# Patient Record
Sex: FEMALE | Race: WHITE
[De-identification: names, ages, dates, MRNs, and addresses within clinical notes are randomized per-mention and may not be internally consistent; named-entity substitution may affect disease eponyms.]

---

## 2018-05-09 ENCOUNTER — HOSPITAL ENCOUNTER (OUTPATIENT)
Dept: HOSPITAL 91 - OBT | Age: 28
Discharge: HOME | End: 2018-05-09
Payer: MEDICAID

## 2018-05-09 ENCOUNTER — HOSPITAL ENCOUNTER (OUTPATIENT)
Age: 28
Discharge: HOME | End: 2018-05-09

## 2018-05-09 DIAGNOSIS — Z3A.40: ICD-10-CM

## 2018-05-09 PROCEDURE — 76818 FETAL BIOPHYS PROFILE W/NST: CPT

## 2018-05-12 ENCOUNTER — HOSPITAL ENCOUNTER (INPATIENT)
Dept: HOSPITAL 91 - OBT | Age: 28
LOS: 3 days | Discharge: HOME | End: 2018-05-15
Payer: MEDICAID

## 2018-05-12 ENCOUNTER — HOSPITAL ENCOUNTER (INPATIENT)
Age: 28
LOS: 3 days | Discharge: HOME | End: 2018-05-15

## 2018-05-12 DIAGNOSIS — Z3A.40: ICD-10-CM

## 2018-05-12 DIAGNOSIS — Z23: ICD-10-CM

## 2018-05-12 DIAGNOSIS — O48.0: Primary | ICD-10-CM

## 2018-05-12 LAB
ADD MAN DIFF?: NO
BASOPHIL #: 0 10^3/UL (ref 0–0.1)
BASOPHILS %: 0.5 % (ref 0–2)
EOSINOPHILS #: 0.2 10^3/UL (ref 0–0.5)
EOSINOPHILS %: 3.1 % (ref 0–7)
HEMATOCRIT: 29.3 % (ref 37–47)
HEMOGLOBIN: 9.5 G/DL (ref 12–16)
INR: 0.99
LYMPHOCYTES #: 2.1 10^3/UL (ref 0.8–2.9)
LYMPHOCYTES %: 27.5 % (ref 15–51)
MEAN CORPUSCULAR HEMOGLOBIN: 28.6 PG (ref 29–33)
MEAN CORPUSCULAR HGB CONC: 32.4 G/DL (ref 32–37)
MEAN CORPUSCULAR VOLUME: 88.3 FL (ref 82–101)
MEAN PLATELET VOLUME: 11.5 FL (ref 7.4–10.4)
MONOCYTE #: 0.7 10^3/UL (ref 0.3–0.9)
MONOCYTES %: 9 % (ref 0–11)
NEUTROPHIL #: 4.5 10^3/UL (ref 1.6–7.5)
NEUTROPHILS %: 59 % (ref 39–77)
NUCLEATED RED BLOOD CELLS #: 0 10^3/UL (ref 0–0)
NUCLEATED RED BLOOD CELLS%: 0 /100WBC (ref 0–0)
PARTIAL THROMBOPLASTIN TIME: 26.3 SEC (ref 25–35)
PLATELET COUNT: 210 10^3/UL (ref 140–415)
PROTIME: 13.2 SEC (ref 11.9–14.9)
PT RATIO: 1
RAPID PLASMA REAGIN: NONREACTIVE
RED BLOOD COUNT: 3.32 10^6/UL (ref 4.2–5.4)
RED CELL DISTRIBUTION WIDTH: 13.6 % (ref 11.5–14.5)
WHITE BLOOD COUNT: 7.7 10^3/UL (ref 4.8–10.8)

## 2018-05-12 PROCEDURE — 86900 BLOOD TYPING SEROLOGIC ABO: CPT

## 2018-05-12 PROCEDURE — 85025 COMPLETE CBC W/AUTO DIFF WBC: CPT

## 2018-05-12 PROCEDURE — 85730 THROMBOPLASTIN TIME PARTIAL: CPT

## 2018-05-12 PROCEDURE — 86592 SYPHILIS TEST NON-TREP QUAL: CPT

## 2018-05-12 PROCEDURE — 86901 BLOOD TYPING SEROLOGIC RH(D): CPT

## 2018-05-12 PROCEDURE — 88307 TISSUE EXAM BY PATHOLOGIST: CPT

## 2018-05-12 PROCEDURE — 76815 OB US LIMITED FETUS(S): CPT

## 2018-05-12 PROCEDURE — 85610 PROTHROMBIN TIME: CPT

## 2018-05-12 PROCEDURE — 4A1HXCZ MONITORING OF PRODUCTS OF CONCEPTION, CARDIAC RATE, EXTERNAL APPROACH: ICD-10-PCS

## 2018-05-12 PROCEDURE — 86850 RBC ANTIBODY SCREEN: CPT

## 2018-05-12 PROCEDURE — 62319: CPT

## 2018-05-12 PROCEDURE — 36415 COLL VENOUS BLD VENIPUNCTURE: CPT

## 2018-05-12 PROCEDURE — 90715 TDAP VACCINE 7 YRS/> IM: CPT

## 2018-05-12 RX ADMIN — AMPICILLIN 1 MLS/HR: 1 INJECTION, POWDER, FOR SOLUTION INTRAMUSCULAR; INTRAVENOUS at 08:43

## 2018-05-12 RX ADMIN — PYRIDOXINE HYDROCHLORIDE 1 MLS/HR: 100 INJECTION, SOLUTION INTRAMUSCULAR; INTRAVENOUS at 10:43

## 2018-05-12 RX ADMIN — AMPICILLIN 1 MLS/HR: 2 INJECTION, POWDER, FOR SOLUTION INTRAVENOUS at 05:38

## 2018-05-12 RX ADMIN — ACETAMINOPHEN 1 MG: 325 TABLET, FILM COATED ORAL at 19:25

## 2018-05-12 RX ADMIN — BUTORPHANOL TARTRATE 1 MG: 2 INJECTION, SOLUTION INTRAMUSCULAR; INTRAVENOUS at 07:35

## 2018-05-12 RX ADMIN — AMPICILLIN 1 MLS/HR: 1 INJECTION, POWDER, FOR SOLUTION INTRAMUSCULAR; INTRAVENOUS at 19:47

## 2018-05-12 RX ADMIN — PYRIDOXINE HYDROCHLORIDE 1 MLS/HR: 100 INJECTION, SOLUTION INTRAMUSCULAR; INTRAVENOUS at 07:39

## 2018-05-12 RX ADMIN — Medication 1 MLS/HR: at 11:31

## 2018-05-12 RX ADMIN — FENTANYL CIT 0.2 MG/100ML-ROPIV 0.2%-NACL 0.9% EPIDURAL INJ 1 ML: 2/0.2 SOLUTION at 19:58

## 2018-05-12 RX ADMIN — PYRIDOXINE HYDROCHLORIDE 1 MLS/HR: 100 INJECTION, SOLUTION INTRAMUSCULAR; INTRAVENOUS at 21:42

## 2018-05-12 RX ADMIN — AMPICILLIN 1 MLS/HR: 1 INJECTION, POWDER, FOR SOLUTION INTRAMUSCULAR; INTRAVENOUS at 16:33

## 2018-05-12 RX ADMIN — PYRIDOXINE HYDROCHLORIDE 1 MLS/HR: 100 INJECTION, SOLUTION INTRAMUSCULAR; INTRAVENOUS at 19:19

## 2018-05-12 RX ADMIN — AMPICILLIN 1 MLS/HR: 1 INJECTION, POWDER, FOR SOLUTION INTRAMUSCULAR; INTRAVENOUS at 12:47

## 2018-05-12 RX ADMIN — PYRIDOXINE HYDROCHLORIDE 1 MLS/HR: 100 INJECTION, SOLUTION INTRAMUSCULAR; INTRAVENOUS at 16:56

## 2018-05-12 RX ADMIN — Medication 1 MCG: at 06:41

## 2018-05-12 RX ADMIN — PYRIDOXINE HYDROCHLORIDE 1 MLS/HR: 100 INJECTION, SOLUTION INTRAMUSCULAR; INTRAVENOUS at 04:50

## 2018-05-12 RX ADMIN — Medication 1 MLS/HR: at 23:54

## 2018-05-13 RX ADMIN — IBUPROFEN 1 MG: 600 TABLET ORAL at 18:02

## 2018-05-13 RX ADMIN — IBUPROFEN 1 MG: 600 TABLET ORAL at 23:45

## 2018-05-13 RX ADMIN — Medication 1 SPRAY: at 06:19

## 2018-05-13 RX ADMIN — IBUPROFEN 1 MG: 600 TABLET ORAL at 00:30

## 2018-05-13 RX ADMIN — WITCH HAZEL 1 PAD: 500 SOLUTION RECTAL; TOPICAL at 06:19

## 2018-05-13 RX ADMIN — IBUPROFEN 1 MG: 600 TABLET ORAL at 06:19

## 2018-05-13 RX ADMIN — IBUPROFEN 1 MG: 600 TABLET ORAL at 12:46

## 2018-05-13 RX ADMIN — Medication 1 APPLIC: at 06:19

## 2018-05-14 LAB
ADD MAN DIFF?: NO
BASOPHIL #: 0.1 10^3/UL (ref 0–0.1)
BASOPHILS %: 0.5 % (ref 0–2)
EOSINOPHILS #: 0.4 10^3/UL (ref 0–0.5)
EOSINOPHILS %: 3 % (ref 0–7)
HEMATOCRIT: 25.8 % (ref 37–47)
HEMOGLOBIN: 8.5 G/DL (ref 12–16)
LYMPHOCYTES #: 3.5 10^3/UL (ref 0.8–2.9)
LYMPHOCYTES %: 23.4 % (ref 15–51)
MEAN CORPUSCULAR HEMOGLOBIN: 29.2 PG (ref 29–33)
MEAN CORPUSCULAR HGB CONC: 32.9 G/DL (ref 32–37)
MEAN CORPUSCULAR VOLUME: 88.7 FL (ref 82–101)
MEAN PLATELET VOLUME: 11.3 FL (ref 7.4–10.4)
MONOCYTE #: 1 10^3/UL (ref 0.3–0.9)
MONOCYTES %: 6.6 % (ref 0–11)
NEUTROPHIL #: 9.8 10^3/UL (ref 1.6–7.5)
NEUTROPHILS %: 65.7 % (ref 39–77)
NUCLEATED RED BLOOD CELLS #: 0 10^3/UL (ref 0–0)
NUCLEATED RED BLOOD CELLS%: 0 /100WBC (ref 0–0)
PLATELET COUNT: 179 10^3/UL (ref 140–415)
RED BLOOD COUNT: 2.91 10^6/UL (ref 4.2–5.4)
RED CELL DISTRIBUTION WIDTH: 14.2 % (ref 11.5–14.5)
WHITE BLOOD COUNT: 14.9 10^3/UL (ref 4.8–10.8)

## 2018-05-14 RX ADMIN — IBUPROFEN 1 MG: 600 TABLET ORAL at 17:53

## 2018-05-14 RX ADMIN — IBUPROFEN 1 MG: 600 TABLET ORAL at 12:00

## 2018-05-14 RX ADMIN — IBUPROFEN 1 MG: 600 TABLET ORAL at 06:00

## 2018-05-15 PROCEDURE — 3E0234Z INTRODUCTION OF SERUM, TOXOID AND VACCINE INTO MUSCLE, PERCUTANEOUS APPROACH: ICD-10-PCS

## 2018-05-15 RX ADMIN — CLOSTRIDIUM TETANI TOXOID ANTIGEN (FORMALDEHYDE INACTIVATED), CORYNEBACTERIUM DIPHTHERIAE TOXOID ANTIGEN (FORMALDEHYDE INACTIVATED), BORDETELLA PERTUSSIS TOXOID ANTIGEN (GLUTARALDEHYDE INACTIVATED), BORDETELLA PERTUSSIS FILAMENTOUS HEMAGGLUTININ ANTIGEN (FORMALDEHYDE INACTIVATED), BORDETELLA PERTUSSIS PERTACTIN ANTIGEN, AND BORDETELLA PERTUSSIS FIMBRIAE 2/3 ANTIGEN 1 ML: 5; 2; 2.5; 5; 3; 5 INJECTION, SUSPENSION INTRAMUSCULAR at 10:34

## 2018-05-15 RX ADMIN — DOCUSATE SODIUM AND SENNOSIDES 1 TAB: 8.6; 5 TABLET, FILM COATED ORAL at 10:33

## 2018-05-15 RX ADMIN — IBUPROFEN 1 MG: 600 TABLET ORAL at 06:21

## 2018-05-15 RX ADMIN — MEASLES, MUMPS, AND RUBELLA VIRUS VACCINE LIVE 1 ML: 1000; 12500; 1000 INJECTION, POWDER, LYOPHILIZED, FOR SUSPENSION SUBCUTANEOUS at 07:40

## 2018-05-15 RX ADMIN — IBUPROFEN 1 MG: 600 TABLET ORAL at 00:41

## 2018-12-21 ENCOUNTER — HOSPITAL ENCOUNTER (EMERGENCY)
Age: 28
Discharge: HOME | End: 2018-12-21

## 2018-12-21 ENCOUNTER — HOSPITAL ENCOUNTER (EMERGENCY)
Dept: HOSPITAL 91 - FTE | Age: 28
Discharge: HOME | End: 2018-12-21
Payer: SELF-PAY

## 2018-12-21 DIAGNOSIS — R10.2: ICD-10-CM

## 2018-12-21 DIAGNOSIS — N39.0: Primary | ICD-10-CM

## 2018-12-21 DIAGNOSIS — Z33.1: ICD-10-CM

## 2018-12-21 LAB
ADD MAN DIFF?: NO
ADD UMIC: YES
BASOPHIL #: 0 10^3/UL (ref 0–0.1)
BASOPHILS %: 0.3 % (ref 0–2)
EOSINOPHILS #: 0 10^3/UL (ref 0–0.5)
EOSINOPHILS %: 0.2 % (ref 0–7)
HEMATOCRIT: 34.9 % (ref 37–47)
HEMOGLOBIN: 11.7 G/DL (ref 12–16)
IMMATURE GRANS #M: 0.09 10^3/UL (ref 0–0.03)
IMMATURE GRANS % (M): 0.6 % (ref 0–0.43)
LYMPHOCYTES #: 0.8 10^3/UL (ref 0.8–2.9)
LYMPHOCYTES %: 5.7 % (ref 15–51)
MEAN CORPUSCULAR HEMOGLOBIN: 29.2 PG (ref 29–33)
MEAN CORPUSCULAR HGB CONC: 33.5 G/DL (ref 32–37)
MEAN CORPUSCULAR VOLUME: 87 FL (ref 82–101)
MEAN PLATELET VOLUME: 11.1 FL (ref 7.4–10.4)
MONOCYTE #: 1.2 10^3/UL (ref 0.3–0.9)
MONOCYTES %: 8.4 % (ref 0–11)
NEUTROPHIL #: 11.9 10^3/UL (ref 1.6–7.5)
NEUTROPHILS %: 84.8 % (ref 39–77)
NUCLEATED RED BLOOD CELLS #: 0 10^3/UL (ref 0–0)
NUCLEATED RED BLOOD CELLS%: 0 /100WBC (ref 0–0)
PLATELET COUNT: 237 10^3/UL (ref 140–415)
RED BLOOD COUNT: 4.01 10^6/UL (ref 4.2–5.4)
RED CELL DISTRIBUTION WIDTH: 13.8 % (ref 11.5–14.5)
UR ASCORBIC ACID: NEGATIVE MG/DL
UR BACTERIA: (no result) /HPF
UR BILIRUBIN (DIP): NEGATIVE MG/DL
UR BLOOD (DIP): (no result) MG/DL
UR CLARITY: CLEAR
UR COLOR: YELLOW
UR GLUCOSE (DIP): NEGATIVE MG/DL
UR KETONES (DIP): (no result) MG/DL
UR LEUKOCYTE ESTERASE (DIP): (no result) LEU/UL
UR NITRITE (DIP): NEGATIVE MG/DL
UR PH (DIP): 7 (ref 5–9)
UR RBC: 1 /HPF (ref 0–5)
UR SPECIFIC GRAVITY (DIP): 1.01 (ref 1–1.03)
UR SQUAMOUS EPITHELIAL CELL: (no result) /HPF
UR TOTAL PROTEIN (DIP): NEGATIVE MG/DL
UR UROBILINOGEN (DIP): (no result) MG/DL
UR WBC: 46 /HPF (ref 0–5)
URINE BLOOD (DIP) POC: (no result)
URINE LEUKOCYTE EST (DIP) POC: (no result)
URINE PH (DIP) POC: 7 (ref 5–8.5)
URINE TOTAL PROTEIN POC: (no result)
WHITE BLOOD COUNT: 14.1 10^3/UL (ref 4.8–10.8)

## 2018-12-21 PROCEDURE — 84702 CHORIONIC GONADOTROPIN TEST: CPT

## 2018-12-21 PROCEDURE — 99285 EMERGENCY DEPT VISIT HI MDM: CPT

## 2018-12-21 PROCEDURE — 76801 OB US < 14 WKS SINGLE FETUS: CPT

## 2018-12-21 PROCEDURE — 81025 URINE PREGNANCY TEST: CPT

## 2018-12-21 PROCEDURE — 36415 COLL VENOUS BLD VENIPUNCTURE: CPT

## 2018-12-21 PROCEDURE — 81003 URINALYSIS AUTO W/O SCOPE: CPT

## 2018-12-21 PROCEDURE — 81001 URINALYSIS AUTO W/SCOPE: CPT

## 2018-12-21 PROCEDURE — 86900 BLOOD TYPING SEROLOGIC ABO: CPT

## 2018-12-21 PROCEDURE — 87400 INFLUENZA A/B EACH AG IA: CPT

## 2018-12-21 PROCEDURE — 96372 THER/PROPH/DIAG INJ SC/IM: CPT

## 2018-12-21 PROCEDURE — 85025 COMPLETE CBC W/AUTO DIFF WBC: CPT

## 2018-12-21 PROCEDURE — 86901 BLOOD TYPING SEROLOGIC RH(D): CPT

## 2018-12-21 PROCEDURE — 87086 URINE CULTURE/COLONY COUNT: CPT

## 2018-12-21 RX ADMIN — CEFTRIAXONE SODIUM 1 GM: 1 INJECTION, POWDER, FOR SOLUTION INTRAMUSCULAR; INTRAVENOUS at 11:24

## 2018-12-21 RX ADMIN — LIDOCAINE HYDROCHLORIDE 1 ML: 10 INJECTION, SOLUTION EPIDURAL; INFILTRATION; INTRACAUDAL; PERINEURAL at 11:24

## 2018-12-21 RX ADMIN — ONDANSETRON 1 MG: 4 TABLET, ORALLY DISINTEGRATING ORAL at 09:27

## 2018-12-21 RX ADMIN — ACETAMINOPHEN 1 MG: 500 TABLET, FILM COATED ORAL at 09:26

## 2019-06-23 ENCOUNTER — HOSPITAL ENCOUNTER (INPATIENT)
Dept: HOSPITAL 91 - OBT | Age: 29
LOS: 2 days | Discharge: HOME | DRG: 831 | End: 2019-06-25
Payer: MEDICAID

## 2019-06-23 ENCOUNTER — HOSPITAL ENCOUNTER (INPATIENT)
Dept: HOSPITAL 10 - L-D | Age: 29
LOS: 2 days | Discharge: HOME | DRG: 831 | End: 2019-06-25
Attending: OBSTETRICS & GYNECOLOGY | Admitting: OBSTETRICS & GYNECOLOGY
Payer: MEDICAID

## 2019-06-23 VITALS
HEIGHT: 63 IN | HEIGHT: 63 IN | WEIGHT: 174.17 LBS | BODY MASS INDEX: 30.86 KG/M2 | BODY MASS INDEX: 30.86 KG/M2 | WEIGHT: 174.17 LBS

## 2019-06-23 VITALS — HEART RATE: 80 BPM | RESPIRATION RATE: 18 BRPM | SYSTOLIC BLOOD PRESSURE: 117 MMHG | DIASTOLIC BLOOD PRESSURE: 63 MMHG

## 2019-06-23 DIAGNOSIS — O23.03: Primary | ICD-10-CM

## 2019-06-23 DIAGNOSIS — N13.6: ICD-10-CM

## 2019-06-23 DIAGNOSIS — O60.03: ICD-10-CM

## 2019-06-23 DIAGNOSIS — O99.89: ICD-10-CM

## 2019-06-23 DIAGNOSIS — Z3A.36: ICD-10-CM

## 2019-06-23 LAB
ADD MAN DIFF?: NO
ADD UMIC: YES
BASOPHIL #: 0 10^3/UL (ref 0–0.1)
BASOPHILS %: 0.4 % (ref 0–2)
EOSINOPHILS #: 0.2 10^3/UL (ref 0–0.5)
EOSINOPHILS %: 2.4 % (ref 0–7)
HEMATOCRIT: 27.6 % (ref 37–47)
HEMOGLOBIN: 8.8 G/DL (ref 12–16)
IMMATURE GRANS #M: 0.07 10^3/UL (ref 0–0.03)
IMMATURE GRANS % (M): 0.7 % (ref 0–0.43)
LYMPHOCYTES #: 1.6 10^3/UL (ref 0.8–2.9)
LYMPHOCYTES %: 17.1 % (ref 15–51)
MEAN CORPUSCULAR HEMOGLOBIN: 27.8 PG (ref 29–33)
MEAN CORPUSCULAR HGB CONC: 31.9 G/DL (ref 32–37)
MEAN CORPUSCULAR VOLUME: 87.1 FL (ref 82–101)
MEAN PLATELET VOLUME: 10.4 FL (ref 7.4–10.4)
MONOCYTE #: 0.7 10^3/UL (ref 0.3–0.9)
MONOCYTES %: 7.4 % (ref 0–11)
NEUTROPHIL #: 6.7 10^3/UL (ref 1.6–7.5)
NEUTROPHILS %: 72 % (ref 39–77)
NUCLEATED RED BLOOD CELLS #: 0 10^3/UL (ref 0–0)
NUCLEATED RED BLOOD CELLS%: 0 /100WBC (ref 0–0)
PLATELET COUNT: 189 10^3/UL (ref 140–415)
RED BLOOD COUNT: 3.17 10^6/UL (ref 4.2–5.4)
RED CELL DISTRIBUTION WIDTH: 14.6 % (ref 11.5–14.5)
UR ASCORBIC ACID: NEGATIVE MG/DL
UR BACTERIA: (no result) /HPF
UR BILIRUBIN (DIP): NEGATIVE MG/DL
UR BLOOD (DIP): (no result) MG/DL
UR CLARITY: (no result)
UR COLOR: YELLOW
UR GLUCOSE (DIP): NEGATIVE MG/DL
UR KETONES (DIP): NEGATIVE MG/DL
UR LEUKOCYTE ESTERASE (DIP): (no result) LEU/UL
UR NITRITE (DIP): NEGATIVE MG/DL
UR PH (DIP): 7 (ref 5–9)
UR RBC: 4 /HPF (ref 0–5)
UR SPECIFIC GRAVITY (DIP): 1.01 (ref 1–1.03)
UR SQUAMOUS EPITHELIAL CELL: (no result) /HPF
UR TOTAL PROTEIN (DIP): NEGATIVE MG/DL
UR UROBILINOGEN (DIP): NEGATIVE MG/DL
UR WBC: 8 /HPF (ref 0–5)
WHITE BLOOD COUNT: 9.3 10^3/UL (ref 4.8–10.8)

## 2019-06-23 PROCEDURE — G0463 HOSPITAL OUTPT CLINIC VISIT: HCPCS

## 2019-06-23 PROCEDURE — 76818 FETAL BIOPHYS PROFILE W/NST: CPT

## 2019-06-23 PROCEDURE — 81001 URINALYSIS AUTO W/SCOPE: CPT

## 2019-06-23 PROCEDURE — 76775 US EXAM ABDO BACK WALL LIM: CPT

## 2019-06-23 PROCEDURE — 85025 COMPLETE CBC W/AUTO DIFF WBC: CPT

## 2019-06-23 PROCEDURE — 83540 ASSAY OF IRON: CPT

## 2019-06-23 PROCEDURE — 87086 URINE CULTURE/COLONY COUNT: CPT

## 2019-06-23 PROCEDURE — 82728 ASSAY OF FERRITIN: CPT

## 2019-06-23 RX ADMIN — PYRIDOXINE HYDROCHLORIDE 1 MLS/HR: 100 INJECTION, SOLUTION INTRAMUSCULAR; INTRAVENOUS at 19:30

## 2019-06-23 RX ADMIN — CEFAZOLIN SODIUM 1 MLS/HR: 2 SOLUTION INTRAVENOUS at 13:23

## 2019-06-23 RX ADMIN — CLINDAMYCIN IN 5 PERCENT DEXTROSE 1 MLS/HR: 18 INJECTION, SOLUTION INTRAVENOUS at 23:31

## 2019-06-23 RX ADMIN — PYRIDOXINE HYDROCHLORIDE SCH MLS/HR: 100 INJECTION, SOLUTION INTRAMUSCULAR; INTRAVENOUS at 11:44

## 2019-06-23 RX ADMIN — CLINDAMYCIN PHOSPHATE SCH MLS/HR: 18 INJECTION, SOLUTION INTRAVENOUS at 23:31

## 2019-06-23 RX ADMIN — PYRIDOXINE HYDROCHLORIDE SCH MLS/HR: 100 INJECTION, SOLUTION INTRAMUSCULAR; INTRAVENOUS at 19:30

## 2019-06-23 RX ADMIN — DIPHENHYDRAMINE HYDROCHLORIDE 1 MG: 50 INJECTION, SOLUTION INTRAMUSCULAR; INTRAVENOUS at 14:20

## 2019-06-23 RX ADMIN — PYRIDOXINE HYDROCHLORIDE 1 MLS/HR: 100 INJECTION, SOLUTION INTRAMUSCULAR; INTRAVENOUS at 11:44

## 2019-06-23 NOTE — HP
Date/Time of Note


Date/Time of Note


DATE: 19 


TIME: 11:26





OB - History


Hx of Present Pregnancy


Free Text/Dictation


29+wks GA with left CVA tenderness and pyelonephritis


:  4


Para:  2


Prenatal Care:  Good Care


Ultrasounds:  Normal mid trimester US


Obstetrical Complications:  None


Medical Complications:  None





Past Family/Social History


*


Past Medical, Surgical, Family and Obstetric Histories reviewed from prenatal 


chart.





OB  Admission Exam


Vital Signs


Vital Signs





Vital Signs


  Date      Temp  Pulse  Resp  B/P (MAP)   Pulse Ox  O2          O2 Flow    FiO2


Time                                                 Delivery    Rate


   19  98.1     80    18      117/63            Room Air


     09:43                           (81)








Physical Exam


Abdomen:  WNL


Cervical Dilatation:  None


Effacement:  0%


Membranes:  Intact


Fetal Heart Rate:  140's


Accelerations:  Accelerations Present


Decelerations:  No Decelerations


Varibility:  Moderate


Contractions on Admission:  None


Last 72 hours Lab Results


                                    CBC & BMP


19 10:00











OB  Assessment/Plan


Reason for admission:  observation


Other Assessment:


PMH Denies


PSH Druies


Jazmyn NKDA


Plan:  Expectant Management











BHARTI HOFFMAN M.D.            2019 11:28

## 2019-06-23 NOTE — TRIAGE
===================================

OB Triage

===================================

Datetime Report Generated by CPN: 06/23/2019 11:41

   

   

===========================

Datetime: 06/23/2019 09:36

===========================

   

 Time of Arrival:  06/23/2019 09:20

 EGA:  36.1

 Arrived By:  Wheelchair

 Chief Complaint:  LEFT LOWER BACK PAIN SINCE 2400

 Fetal Movement:  Present

 Contractions:  Denies/Absent

 Rupture of Membranes:  Denies

 Vaginal Bleeding:  None

 Vaginal Discharge:  Denies

 Recent Sexual Intercouse:  Denies

 Abdominal Trauma:  Not Applicable

 Patient Complaints:  Back Pain

 Time Provider Notified:  06/23/2019 09:45

 Provider Notified:  DR SHERIFF

 Initial Plan:  [facility],ALL DR HADADIAN

   

===================================

Maternal Assessment

===================================

   

 Level of Consciousness:  Keenly Alert, Responsive

 DTR's/Clonus:  DTRs 2+; No Clonus

 Headache:  Denies

 Blurred Vision:  No

 Respiratory Effort:  Unlabored; Regular Rhythm; Equal Expansion

 Breath Sounds, Left:  Clear and Equal

 Breath Sounds, Right:  Clear and Equal

 Nausea/Vomiting:  Denies

 RUQ Epigastric Pain:  Denies

 Facial Edema:  None

 Temperature Route:  Axillary

   

===================================

Fall Risk Assessment

===================================

   

 History of Falling:  (0) No

 Secondary Diagnosis:  (0) No

 Ambulatory Aid:  (0) Bedrest/Nurse Assist

 IV Therapy:  (0) No

 Gait:  (0) Normal/Bedrest/Immobile

 Mental Status:  (0) Oriented to Own Ability

 Fall Score:  0

 Fall Risk Score Definition:  No Risk: No action required

   

===========================

Datetime: 06/23/2019 09:31

===========================

   

   

===================================

Maternal Assessment

===================================

   

 Level of Consciousness:  Keenly Alert, Responsive

 DTR's/Clonus:  DTRs 2+

 Headache:  Denies

 Blurred Vision:  No

 Nausea/Vomiting:  Denies

 RUQ Epigastric Pain:  Denies

 Facial Edema:  None

   

===================================

Labor Evaluation

===================================

   

 Frequency:  NONE

 Pattern:  Normal: <= 5 Contractions in 10 Minutes

 Resting Tone [place]:  Relaxed

   

===================================

Fetal Heart Rate

===================================

   

 FHR Baseline Rate:  150

 Monitor Mode:  External US

 FHR Baseline Changes:  No Baseline Change

 Variability:  Moderate 6-25 bpm

 Accelerations:  15X15

 Decelerations:  None

 Category:  Category I

   

===================================

Pain Assessment

===================================

   

 Pain Scale:  8

 Pain Presence:  Constant

 Pain Type:  Ache

 Pain Location:  Back

 Pain Goal:  3

   

===================================

Vaginal Exam

===================================

   

 Membrane Status:  Intact

## 2019-06-24 LAB
% IRON SATURATION: 21 % SAT (ref 22–52)
FERRITIN: 10.3 NG/ML (ref 6.2–137)
IRON: 119 UG/DL (ref 35–150)
TOTAL IRON BINDING CAPACITY: 566 UG/DL (ref 241–421)

## 2019-06-24 RX ADMIN — CLINDAMYCIN PHOSPHATE SCH MLS/HR: 18 INJECTION, SOLUTION INTRAVENOUS at 22:14

## 2019-06-24 RX ADMIN — CLINDAMYCIN PHOSPHATE SCH MLS/HR: 18 INJECTION, SOLUTION INTRAVENOUS at 13:30

## 2019-06-24 RX ADMIN — PYRIDOXINE HYDROCHLORIDE SCH MLS/HR: 100 INJECTION, SOLUTION INTRAMUSCULAR; INTRAVENOUS at 19:25

## 2019-06-24 RX ADMIN — PYRIDOXINE HYDROCHLORIDE SCH MLS/HR: 100 INJECTION, SOLUTION INTRAMUSCULAR; INTRAVENOUS at 13:30

## 2019-06-24 RX ADMIN — FERROUS SULFATE TAB 325 MG (65 MG ELEMENTAL FE) SCH MG: 325 (65 FE) TAB at 10:27

## 2019-06-24 RX ADMIN — CLINDAMYCIN PHOSPHATE SCH MLS/HR: 18 INJECTION, SOLUTION INTRAVENOUS at 06:21

## 2019-06-24 RX ADMIN — PYRIDOXINE HYDROCHLORIDE 1 MLS/HR: 100 INJECTION, SOLUTION INTRAMUSCULAR; INTRAVENOUS at 13:30

## 2019-06-24 RX ADMIN — CLINDAMYCIN IN 5 PERCENT DEXTROSE 1 MLS/HR: 18 INJECTION, SOLUTION INTRAVENOUS at 06:21

## 2019-06-24 RX ADMIN — CLINDAMYCIN IN 5 PERCENT DEXTROSE 1 MLS/HR: 18 INJECTION, SOLUTION INTRAVENOUS at 13:30

## 2019-06-24 RX ADMIN — CLINDAMYCIN IN 5 PERCENT DEXTROSE 1 MLS/HR: 18 INJECTION, SOLUTION INTRAVENOUS at 22:14

## 2019-06-24 RX ADMIN — Medication 1 TAB: at 10:27

## 2019-06-24 RX ADMIN — PYRIDOXINE HYDROCHLORIDE 1 MLS/HR: 100 INJECTION, SOLUTION INTRAMUSCULAR; INTRAVENOUS at 01:35

## 2019-06-24 RX ADMIN — PYRIDOXINE HYDROCHLORIDE 1 MLS/HR: 100 INJECTION, SOLUTION INTRAMUSCULAR; INTRAVENOUS at 19:25

## 2019-06-24 RX ADMIN — FERROUS SULFATE TAB 325 MG (65 MG ELEMENTAL FE) 1 MG: 325 (65 FE) TAB at 10:27

## 2019-06-24 RX ADMIN — PYRIDOXINE HYDROCHLORIDE SCH MLS/HR: 100 INJECTION, SOLUTION INTRAMUSCULAR; INTRAVENOUS at 01:35

## 2019-06-24 RX ADMIN — Medication SCH TAB: at 10:27

## 2019-06-24 NOTE — QN
Documentation


Comment


29-year-old  4 para 2 at 36 weeks and 2 days of gestation with estimated 


date of delivery 2019


Patient admitted for diagnosis of pyelonephritis with left CVA tenderness and 


mild left hydronephrosis


She is currently on IV clindamycin 900 mg every 8 hours


Patient has remained afebrile since admission


Patient reports positive fetal movement, denies vaginal bleeding and leaking 


fluid, denies uterine contractions





                          VS - Last 72 Hours, by Label


  Date      Temp  Pulse  Resp  B/P (MAP)   Pulse Ox  O2          O2 Flow    FiO2


Time                                                 Delivery    Rate


   19  98.1     80    18      117/63            Room Air


     09:43                           (81)





Fetal heart rate tracing category 1


[place] none








                               Hematology - 72 Hrs


       Test
                                               19
10:00


       Hematocrit
                                27.6 %
(37.0-47.0)  #L


       Hemoglobin
                              8.8 g/dl
(12.0-16.0)  #L


       Mean Corpuscular Hemoglobin
               27.8 pg
(29.0-33.0)  L


       Mean Corpuscular Hemoglobin
Concent      31.9 g/dl
(32.0-37.0)  L


       Mean Corpuscular Volume
                     87.1 fl
(82.0-101.0)


       Mean Platelet Volume
                          10.4 fl
(7.4-10.4)


       Platelet Count
                          189 10^3/UL
(140-415)  #


       Red Blood Count
                     3.17 10^6/ul
(4.20-5.40)  #L


       Red Cell Distribution Width
                14.6 %
(11.5-14.5)  H


       White Blood Count
                      9.3 10^3/ul
(4.8-10.8)  #





Chemistry


              Test
                                  19
06:09


              Iron Level
                       119 ug/dl
()


              Total Iron Binding Capacity
  566 ug/dl
(241-421)  H


              Percent Iron Saturation
         21 % SAT
(22-52)  L


              Ferritin
                     10.3 ng/ml
(6.2-137.0)





*** Microbiology ***                                    


                                                                                


          


  URINE CULTURE  Preliminary  


     Organism 1                     GRAM NEGATIVE SINDHU


        COLONY COUNT                30,000 - 40,000 CFU/ml








                                        


PROCEDURE:   OB ultrasound for biophysical profile 


 


CLINICAL INDICATION:   Pain.  Biophysical profile.  Pregnancy. 


 


TECHNIQUE:   Multiple sonographic images of the pelvis were obtained.  


Transabdominal view of the gravid uterus are available for review.  The images 


were reviewed on a PACS workstation.  DICOM images are available. 


 


COMPARISON:   [facility] 5/15/2019


 


FINDINGS:


 


Fetal breathing movement = 2/2


Fetal tone = 2/2


Fetal motion = 2/2


JENNIFER = 2/2


 


Single intrauterine gestation is identified in cephalic position. Fetal heart 


rate is 139 bpm.  Placenta is posterior without evidence for abruption or previ


a.  JENNIFER measures 17.4 cm, within normal limits.


 


IMPRESSION:


 


1.  Single live intrauterine gestation.  


2.  Biophysical profile = 88.


 


RPTAT: PP


_____________________________________________ 


.Patrick Booth MD, MD           Date    Time 


Electronically viewed and signed by .Patrick Booth MD, MD on 2019 


10:35 


 


D:  2019 10:35  T:  2019 10:35


.R/





CC: AJMES SHERIFF





[phone]








                                        


PROCEDURE:   Retroperitoneal US


 


CLINICAL INDICATION:   back pain 


 


TECHNIQUE:   Multiple sonographic images of the retroperitoneum were obtained.  


The images were reviewed on a PACS workstation. 


 


COMPARISON:   No prior studies are available for comparison. 


 


FINDINGS:


Bilateral kidneys demonstrate no cortical thinning. Normal echogenicity 


bilaterally. 


The right kidney measures 11.9 cm.


The left kidney measures 12.4 cm. 


No kidney stones are visualized. Mild left hydronephrosis.


 


The urinary bladder is minimally distended.  


 


IMPRESSION:


Mild left hydronephrosis.


 


RPTAT: HMPE


_____________________________________________ 


Physician Francoise           Date    Time 


Electronically viewed and signed by Physician Francoise on 2019 


10:51 


 


D:  2019 10:51  T:  2019 10:51


ME/





CC: JAMES SHERIFF





[phone]














Assessment and plan;


Continue with IV antibiotics


We will follow urine culture sensitivity results tomorrow to confirm if 


clindamycin is adequate coverage


Continue with present management











TIANNA SY MD             2019 11:53

## 2019-06-25 LAB
ADD UMIC: YES
UR ASCORBIC ACID: NEGATIVE MG/DL
UR BACTERIA: (no result) /HPF
UR BILIRUBIN (DIP): NEGATIVE MG/DL
UR BLOOD (DIP): (no result) MG/DL
UR CLARITY: (no result)
UR COLOR: YELLOW
UR GLUCOSE (DIP): NEGATIVE MG/DL
UR KETONES (DIP): (no result) MG/DL
UR LEUKOCYTE ESTERASE (DIP): (no result) LEU/UL
UR MUCUS: (no result) /HPF
UR NITRITE (DIP): NEGATIVE MG/DL
UR PH (DIP): 7 (ref 5–9)
UR RBC: 5 /HPF (ref 0–5)
UR SPECIFIC GRAVITY (DIP): 1.01 (ref 1–1.03)
UR SQUAMOUS EPITHELIAL CELL: (no result) /HPF
UR TOTAL PROTEIN (DIP): NEGATIVE MG/DL
UR UROBILINOGEN (DIP): (no result) MG/DL
UR WBC: 13 /HPF (ref 0–5)

## 2019-06-25 RX ADMIN — FERROUS SULFATE TAB 325 MG (65 MG ELEMENTAL FE) SCH MG: 325 (65 FE) TAB at 09:03

## 2019-06-25 RX ADMIN — CLINDAMYCIN IN 5 PERCENT DEXTROSE 1 MLS/HR: 18 INJECTION, SOLUTION INTRAVENOUS at 14:12

## 2019-06-25 RX ADMIN — FERROUS SULFATE TAB 325 MG (65 MG ELEMENTAL FE) 1 MG: 325 (65 FE) TAB at 09:03

## 2019-06-25 RX ADMIN — CLINDAMYCIN PHOSPHATE SCH MLS/HR: 18 INJECTION, SOLUTION INTRAVENOUS at 05:45

## 2019-06-25 RX ADMIN — NITROFURANTOIN MONOHYDRATE/MACROCRYSTALLINE 1 MG: 25; 75 CAPSULE ORAL at 15:58

## 2019-06-25 RX ADMIN — Medication 1 TAB: at 09:03

## 2019-06-25 RX ADMIN — Medication SCH TAB: at 09:03

## 2019-06-25 RX ADMIN — CLINDAMYCIN IN 5 PERCENT DEXTROSE 1 MLS/HR: 18 INJECTION, SOLUTION INTRAVENOUS at 05:45

## 2019-06-25 RX ADMIN — PYRIDOXINE HYDROCHLORIDE SCH MLS/HR: 100 INJECTION, SOLUTION INTRAMUSCULAR; INTRAVENOUS at 03:25

## 2019-06-25 RX ADMIN — PYRIDOXINE HYDROCHLORIDE 1 MLS/HR: 100 INJECTION, SOLUTION INTRAMUSCULAR; INTRAVENOUS at 03:25

## 2019-06-25 RX ADMIN — CLINDAMYCIN PHOSPHATE SCH MLS/HR: 18 INJECTION, SOLUTION INTRAVENOUS at 14:12

## 2019-06-25 NOTE — PD.PPDC
OB/GYN Discharge Instruction


Diagnosis


                [id number]
Final Diagnosis:  Hrrny8z
IUP 36w3d








Condition


                Povyq2Ew
Patient Condition:  Tbfpz8m
Stable








Diet


                [id number]
Diet:  Avtsm9h
Resume Regular Diet





Special Diet:


drink alot of water





Activity/Restrictions


              Gfxnf8Nb
Activity:      Mttes6a
Normal Activity


              Grifb3Rz
Restrictions:  Zhaqz9s
No Exercising


                                        No Lifting


                                        Minimize Stair-climbing


                                        No Sexual Activity


                                        Nothing in the Vagina


                                        No [place]








Follow-up


Provider Information:


see her OB as scheduled





Return to clinic for


         [id number]
GYN Instructions:  Wnkem3i
Fever greater than 101


                                       Chills





Comment:


RTH prn with routine  labor instructions











ARIEL CASANOVA MD                Jun 25, 2019 19:05

## 2019-06-25 NOTE — DS
Date/Time of Note


Date/Time of Note


DATE: 19 


TIME: 18:55





Obstetrical Discharge Record


Final Diagnosis


Final Diagnosis:   not delivered


Other Final Diagnosis


acute pyelonephritis


IUP 36w3d


mild left hydronephrosis





Complications


Other (APN  left hydronephrosis)


Augmentation:  No


Induction:  No


 Rupture of Membranes:  No





Condition on Discharge


Physical Assessment


Last Vitals:


afebrile


UA  wbc  incresed  after  clindamycin  few dose given due to allergy to 


cephalosporin


CVA neg for tenderness  bilateral


even though macrobid is not good choice  since close to term


but not much choice  Rx macrobid q6hr #40


CAT I tracing


Voiding:  Yes


Bowel Movement:  Yes


Breast:  Soft, non-tender


Fundus:  Other ()


Abdomen and Incision:





Episiotomy:


n/a


Calf Tenderness:  No


Patient Condition:  Stable











ARIEL CASANOVA MD                2019 19:02

## 2019-07-13 ENCOUNTER — HOSPITAL ENCOUNTER (INPATIENT)
Dept: HOSPITAL 91 - L-D | Age: 29
LOS: 5 days | Discharge: HOME | End: 2019-07-18
Payer: COMMERCIAL

## 2019-07-13 ENCOUNTER — HOSPITAL ENCOUNTER (INPATIENT)
Dept: HOSPITAL 10 - L-D | Age: 29
LOS: 5 days | Discharge: HOME | End: 2019-07-18
Attending: OBSTETRICS & GYNECOLOGY | Admitting: OBSTETRICS & GYNECOLOGY
Payer: COMMERCIAL

## 2019-07-13 VITALS
BODY MASS INDEX: 31.21 KG/M2 | BODY MASS INDEX: 31.21 KG/M2 | HEIGHT: 63 IN | WEIGHT: 176.15 LBS | WEIGHT: 176.15 LBS | HEIGHT: 63 IN

## 2019-07-13 DIAGNOSIS — O36.63X0: Primary | ICD-10-CM

## 2019-07-13 DIAGNOSIS — Z3A.39: ICD-10-CM

## 2019-07-13 PROCEDURE — 36415 COLL VENOUS BLD VENIPUNCTURE: CPT

## 2019-07-13 PROCEDURE — 85049 AUTOMATED PLATELET COUNT: CPT

## 2019-07-13 PROCEDURE — 86850 RBC ANTIBODY SCREEN: CPT

## 2019-07-13 PROCEDURE — 76815 OB US LIMITED FETUS(S): CPT

## 2019-07-13 PROCEDURE — 85025 COMPLETE CBC W/AUTO DIFF WBC: CPT

## 2019-07-13 PROCEDURE — 85730 THROMBOPLASTIN TIME PARTIAL: CPT

## 2019-07-13 PROCEDURE — 62322 NJX INTERLAMINAR LMBR/SAC: CPT

## 2019-07-13 PROCEDURE — 82728 ASSAY OF FERRITIN: CPT

## 2019-07-13 PROCEDURE — 87340 HEPATITIS B SURFACE AG IA: CPT

## 2019-07-13 PROCEDURE — 86592 SYPHILIS TEST NON-TREP QUAL: CPT

## 2019-07-13 PROCEDURE — P9016 RBC LEUKOCYTES REDUCED: HCPCS

## 2019-07-13 PROCEDURE — 36430 TRANSFUSION BLD/BLD COMPNT: CPT

## 2019-07-13 PROCEDURE — 86900 BLOOD TYPING SEROLOGIC ABO: CPT

## 2019-07-13 PROCEDURE — 86920 COMPATIBILITY TEST SPIN: CPT

## 2019-07-13 PROCEDURE — 86901 BLOOD TYPING SEROLOGIC RH(D): CPT

## 2019-07-13 PROCEDURE — 85610 PROTHROMBIN TIME: CPT

## 2019-07-13 PROCEDURE — 82803 BLOOD GASES ANY COMBINATION: CPT

## 2019-07-13 PROCEDURE — 36600 WITHDRAWAL OF ARTERIAL BLOOD: CPT

## 2019-07-13 PROCEDURE — 83540 ASSAY OF IRON: CPT

## 2019-07-13 PROCEDURE — 85670 THROMBIN TIME PLASMA: CPT

## 2019-07-14 LAB
ADD MAN DIFF?: NO
BASOPHIL #: 0.1 10^3/UL (ref 0–0.1)
BASOPHILS %: 0.6 % (ref 0–2)
EOSINOPHILS #: 0.3 10^3/UL (ref 0–0.5)
EOSINOPHILS %: 3.6 % (ref 0–7)
HEMATOCRIT: 26.8 % (ref 37–47)
HEMOGLOBIN: 8.6 G/DL (ref 12–16)
HEPATITIS B SURFACE ANTIGEN: NEGATIVE
IMMATURE GRANS #M: 0.11 10^3/UL (ref 0–0.03)
IMMATURE GRANS % (M): 1.2 % (ref 0–0.43)
INR: 0.99
LYMPHOCYTES #: 2.4 10^3/UL (ref 0.8–2.9)
LYMPHOCYTES %: 27.4 % (ref 15–51)
MEAN CORPUSCULAR HEMOGLOBIN: 26.5 PG (ref 29–33)
MEAN CORPUSCULAR HGB CONC: 32.1 G/DL (ref 32–37)
MEAN CORPUSCULAR VOLUME: 82.5 FL (ref 82–101)
MEAN PLATELET VOLUME: 10.4 FL (ref 7.4–10.4)
MONOCYTE #: 0.7 10^3/UL (ref 0.3–0.9)
MONOCYTES %: 7.8 % (ref 0–11)
NEUTROPHIL #: 5.3 10^3/UL (ref 1.6–7.5)
NEUTROPHILS %: 59.4 % (ref 39–77)
NUCLEATED RED BLOOD CELLS #: 0 10^3/UL (ref 0–0)
NUCLEATED RED BLOOD CELLS%: 0 /100WBC (ref 0–0)
PARTIAL THROMBOPLASTIN TIME: 22.8 SEC (ref 23–35)
PLATELET COUNT: 246 10^3/UL (ref 140–415)
PROTIME: 13.2 SEC (ref 11.9–14.9)
PT RATIO: 1
RAPID PLASMA REAGIN: NONREACTIVE
RED BLOOD COUNT: 3.25 10^6/UL (ref 4.2–5.4)
RED CELL DISTRIBUTION WIDTH: 14.8 % (ref 11.5–14.5)
WHITE BLOOD COUNT: 8.9 10^3/UL (ref 4.8–10.8)

## 2019-07-14 RX ADMIN — Medication 1 MCG: at 05:31

## 2019-07-14 RX ADMIN — PYRIDOXINE HYDROCHLORIDE SCH MLS/HR: 100 INJECTION, SOLUTION INTRAMUSCULAR; INTRAVENOUS at 22:31

## 2019-07-14 RX ADMIN — BUTORPHANOL TARTRATE 1 MG: 2 INJECTION, SOLUTION INTRAMUSCULAR; INTRAVENOUS at 23:13

## 2019-07-14 RX ADMIN — Medication 1 MCG: at 17:45

## 2019-07-14 RX ADMIN — Medication 1 MCG: at 09:31

## 2019-07-14 RX ADMIN — Medication 1 ML: at 17:00

## 2019-07-14 RX ADMIN — PYRIDOXINE HYDROCHLORIDE SCH MLS/HR: 100 INJECTION, SOLUTION INTRAMUSCULAR; INTRAVENOUS at 00:09

## 2019-07-14 RX ADMIN — Medication PRN MCG: at 13:26

## 2019-07-14 RX ADMIN — PYRIDOXINE HYDROCHLORIDE 1 MLS/HR: 100 INJECTION, SOLUTION INTRAMUSCULAR; INTRAVENOUS at 22:31

## 2019-07-14 RX ADMIN — BUTORPHANOL TARTRATE 1 MG: 2 INJECTION, SOLUTION INTRAMUSCULAR; INTRAVENOUS at 20:02

## 2019-07-14 RX ADMIN — PYRIDOXINE HYDROCHLORIDE SCH MLS/HR: 100 INJECTION, SOLUTION INTRAMUSCULAR; INTRAVENOUS at 13:35

## 2019-07-14 RX ADMIN — PYRIDOXINE HYDROCHLORIDE 1 MLS/HR: 100 INJECTION, SOLUTION INTRAMUSCULAR; INTRAVENOUS at 06:10

## 2019-07-14 RX ADMIN — Medication PRN MCG: at 17:45

## 2019-07-14 RX ADMIN — PYRIDOXINE HYDROCHLORIDE 1 MLS/HR: 100 INJECTION, SOLUTION INTRAMUSCULAR; INTRAVENOUS at 00:09

## 2019-07-14 RX ADMIN — Medication PRN MCG: at 09:31

## 2019-07-14 RX ADMIN — PYRIDOXINE HYDROCHLORIDE SCH MLS/HR: 100 INJECTION, SOLUTION INTRAMUSCULAR; INTRAVENOUS at 06:10

## 2019-07-14 RX ADMIN — Medication 1 MCG: at 01:15

## 2019-07-14 RX ADMIN — Medication 1 MCG: at 13:26

## 2019-07-14 RX ADMIN — PYRIDOXINE HYDROCHLORIDE 1 MLS/HR: 100 INJECTION, SOLUTION INTRAMUSCULAR; INTRAVENOUS at 13:35

## 2019-07-14 RX ADMIN — Medication PRN MCG: at 05:31

## 2019-07-14 RX ADMIN — BUTORPHANOL TARTRATE PRN MG: 2 INJECTION INTRAMUSCULAR; INTRAVENOUS at 20:02

## 2019-07-14 RX ADMIN — Medication PRN MCG: at 01:15

## 2019-07-14 RX ADMIN — BUTORPHANOL TARTRATE PRN MG: 2 INJECTION INTRAMUSCULAR; INTRAVENOUS at 23:13

## 2019-07-14 NOTE — HP
Date/Time of Note


Date/Time of Note


DATE: 19 


TIME: 01:05





OB - History


Hx of Present Pregnancy


Free Text/Dictation


[id number] at 39w1d for IOL for suspected macrosomia.


Initial VE  /long /-3


CAT I  tracing 


GBS Neg


EFW 3973gm


admitted for IOL  by using cytotec .


Chief Complaint:  IOL


Estimated Due Date:  2019


:  4


Para:  2


Spontaneous :  0


Therapeutic :  1


Prenatal Care:  Other


Ultrasounds:  Other


Obstetrical Complications:  None


Medical Complications:  None





Past Family/Social History


*


Past Medical, Surgical, Family and Obstetric Histories reviewed from prenatal 


chart.


Blood Type:  O+


Rubella:  immune


RPR/VDRL:  Negative


GBS Status:  Negative


HBsAG:  Negative





OB  Admission Exam


Physical Exam


HEENT:  WNL


Heart:  Rhythm Normal


Lungs:  Clear, Equal


Abdomen:  WNL


Extremities:  Normal


Reflexes:  Normal


Cervical Dilatation:  1cm


Effacement:  0%


Station:  -3


Membranes:  Intact


Amniotic Fluid:  Unevaluable


Fetal Heart Rate:  130's


Accelerations:  Accelerations Present


Decelerations:  No Decelerations


Varibility:  Moderate


Contractions on Admission:  >10 Minutes Apart


Intensity:  Mild


Last 72 hours Lab Results


                                    CBC & BMP


19 23:50











OB  Assessment/Plan


Reason for admission:  induction of labor


Other Assessment:


IUP 39w1d


Plan:  Induction


Induction Method:  per Misoprostol Protocol











ARIEL CASANOVA MD                2019 01:15

## 2019-07-15 VITALS — RESPIRATION RATE: 19 BRPM | DIASTOLIC BLOOD PRESSURE: 67 MMHG | SYSTOLIC BLOOD PRESSURE: 108 MMHG | HEART RATE: 96 BPM

## 2019-07-15 VITALS — SYSTOLIC BLOOD PRESSURE: 101 MMHG | RESPIRATION RATE: 20 BRPM | DIASTOLIC BLOOD PRESSURE: 56 MMHG | HEART RATE: 107 BPM

## 2019-07-15 VITALS — DIASTOLIC BLOOD PRESSURE: 53 MMHG | RESPIRATION RATE: 22 BRPM | HEART RATE: 115 BPM | SYSTOLIC BLOOD PRESSURE: 93 MMHG

## 2019-07-15 VITALS — SYSTOLIC BLOOD PRESSURE: 96 MMHG | RESPIRATION RATE: 20 BRPM | DIASTOLIC BLOOD PRESSURE: 55 MMHG | HEART RATE: 114 BPM

## 2019-07-15 VITALS — SYSTOLIC BLOOD PRESSURE: 92 MMHG | DIASTOLIC BLOOD PRESSURE: 56 MMHG | RESPIRATION RATE: 20 BRPM | HEART RATE: 111 BPM

## 2019-07-15 VITALS — SYSTOLIC BLOOD PRESSURE: 133 MMHG | RESPIRATION RATE: 22 BRPM | HEART RATE: 109 BPM | DIASTOLIC BLOOD PRESSURE: 75 MMHG

## 2019-07-15 VITALS — RESPIRATION RATE: 19 BRPM | DIASTOLIC BLOOD PRESSURE: 54 MMHG | HEART RATE: 111 BPM | SYSTOLIC BLOOD PRESSURE: 92 MMHG

## 2019-07-15 VITALS — RESPIRATION RATE: 18 BRPM | SYSTOLIC BLOOD PRESSURE: 92 MMHG | HEART RATE: 111 BPM | DIASTOLIC BLOOD PRESSURE: 54 MMHG

## 2019-07-15 LAB
ABNORMAL IP MESSAGE: 1
ADD MAN DIFF?: NO
ARTERIAL CORD BLOOD PCO2: 52.6 MMHG (ref 25–50)
BASOPHIL #: 0 10^3/UL (ref 0–0.1)
BASOPHILS %: 0.2 % (ref 0–2)
CBA BASE EXCESS: -8.4 MMOL/L
CBA COHB: 0.3 %
CBA TOTAL HEMGLOBIN: 13.5 G/DL
CORD BLOOD ARTERIAL PO2: 10.1 MMHG (ref 15–45)
EOSINOPHILS #: 0 10^3/UL (ref 0–0.5)
EOSINOPHILS %: 0 % (ref 0–7)
FIO2: 21 %
FRACTION OXYHGB CORD ARTERIAL: 11.8 %
HEMATOCRIT: 24.9 % (ref 37–47)
HEMOGLOBIN: 7.7 G/DL (ref 12–16)
IMMATURE GRANS #M: 0.16 10^3/UL (ref 0–0.03)
IMMATURE GRANS % (M): 0.9 % (ref 0–0.43)
INR: 1.16
LYMPHOCYTES #: 0.3 10^3/UL (ref 0.8–2.9)
LYMPHOCYTES %: 2 % (ref 15–51)
MEAN CORPUSCULAR HEMOGLOBIN: 26.6 PG (ref 29–33)
MEAN CORPUSCULAR HGB CONC: 30.9 G/DL (ref 32–37)
MEAN CORPUSCULAR VOLUME: 85.9 FL (ref 82–101)
MEAN PLATELET VOLUME: 10.4 FL (ref 7.4–10.4)
METHGB CORD ARTERIAL: 3 %
MODE: (no result)
MONOCYTE #: 0.3 10^3/UL (ref 0.3–0.9)
MONOCYTES %: 1.5 % (ref 0–11)
NEUTROPHIL #: 16.1 10^3/UL (ref 1.6–7.5)
NEUTROPHILS %: 95.4 % (ref 39–77)
NUCLEATED RED BLOOD CELLS #: 0 10^3/UL (ref 0–0)
NUCLEATED RED BLOOD CELLS%: 0 /100WBC (ref 0–0)
O2 A-A PPRESDIFF RESPIRATORY: 76.6 MMHG
PARTIAL THROMBOPLASTIN TIME: 25.5 SEC (ref 23–35)
PLATELET COUNT: 147 10^3/UL (ref 140–415)
PLATELET COUNT: 147 10^3/UL (ref 140–415)
PROTIME: 14.9 SEC (ref 11.9–14.9)
PT RATIO: 1.2
RED BLOOD COUNT: 2.9 10^6/UL (ref 4.2–5.4)
RED CELL DISTRIBUTION WIDTH: 15.1 % (ref 11.5–14.5)
SAMPLE TYPE: (no result)
THROMBIN TIME: 15.7 SEC (ref 13.8–19.1)
WHITE BLOOD COUNT: 16.9 10^3/UL (ref 4.8–10.8)

## 2019-07-15 PROCEDURE — 3E033VJ INTRODUCTION OF OTHER HORMONE INTO PERIPHERAL VEIN, PERCUTANEOUS APPROACH: ICD-10-PCS | Performed by: OBSTETRICS & GYNECOLOGY

## 2019-07-15 PROCEDURE — 3E033VJ INTRODUCTION OF OTHER HORMONE INTO PERIPHERAL VEIN, PERCUTANEOUS APPROACH: ICD-10-PCS

## 2019-07-15 RX ADMIN — SODIUM CITRATE AND CITRIC ACID MONOHYDRATE 1 ML: 500; 334 SOLUTION ORAL at 16:30

## 2019-07-15 RX ADMIN — PYRIDOXINE HYDROCHLORIDE SCH MLS/HR: 100 INJECTION, SOLUTION INTRAMUSCULAR; INTRAVENOUS at 02:00

## 2019-07-15 RX ADMIN — PYRIDOXINE HYDROCHLORIDE SCH MLS/HR: 100 INJECTION, SOLUTION INTRAMUSCULAR; INTRAVENOUS at 03:34

## 2019-07-15 RX ADMIN — PYRIDOXINE HYDROCHLORIDE 1 MLS/HR: 100 INJECTION, SOLUTION INTRAMUSCULAR; INTRAVENOUS at 09:42

## 2019-07-15 RX ADMIN — PYRIDOXINE HYDROCHLORIDE 1 MLS/HR: 100 INJECTION, SOLUTION INTRAMUSCULAR; INTRAVENOUS at 03:34

## 2019-07-15 RX ADMIN — FENTANYL CIT 0.2 MG/100ML-ROPIV 0.2%-NACL 0.9% EPIDURAL INJ SCH ML: 2/0.2 SOLUTION at 14:40

## 2019-07-15 RX ADMIN — KETOROLAC TROMETHAMINE 1 MG: 30 INJECTION, SOLUTION INTRAMUSCULAR at 22:49

## 2019-07-15 RX ADMIN — KETOROLAC TROMETHAMINE PRN MG: 30 INJECTION, SOLUTION INTRAMUSCULAR at 22:49

## 2019-07-15 RX ADMIN — Medication 1 MLS/HR: at 11:37

## 2019-07-15 RX ADMIN — PYRIDOXINE HYDROCHLORIDE PRN MLS/HR: 100 INJECTION, SOLUTION INTRAMUSCULAR; INTRAVENOUS at 01:08

## 2019-07-15 RX ADMIN — FENTANYL CIT 0.2 MG/100ML-ROPIV 0.2%-NACL 0.9% EPIDURAL INJ 1 ML: 2/0.2 SOLUTION at 14:40

## 2019-07-15 RX ADMIN — PYRIDOXINE HYDROCHLORIDE PRN MLS/HR: 100 INJECTION, SOLUTION INTRAMUSCULAR; INTRAVENOUS at 02:16

## 2019-07-15 RX ADMIN — PYRIDOXINE HYDROCHLORIDE 1 MLS/HR: 100 INJECTION, SOLUTION INTRAMUSCULAR; INTRAVENOUS at 02:16

## 2019-07-15 RX ADMIN — PYRIDOXINE HYDROCHLORIDE SCH MLS/HR: 100 INJECTION, SOLUTION INTRAMUSCULAR; INTRAVENOUS at 09:42

## 2019-07-15 RX ADMIN — FENTANYL CIT 0.2 MG/100ML-ROPIV 0.2%-NACL 0.9% EPIDURAL INJ SCH ML: 2/0.2 SOLUTION at 09:36

## 2019-07-15 RX ADMIN — CLINDAMYCIN IN 5 PERCENT DEXTROSE 1 MLS/HR: 18 INJECTION, SOLUTION INTRAVENOUS at 16:40

## 2019-07-15 RX ADMIN — PYRIDOXINE HYDROCHLORIDE 1 MLS/HR: 100 INJECTION, SOLUTION INTRAMUSCULAR; INTRAVENOUS at 02:00

## 2019-07-15 RX ADMIN — FENTANYL CIT 0.2 MG/100ML-ROPIV 0.2%-NACL 0.9% EPIDURAL INJ 1 ML: 2/0.2 SOLUTION at 09:36

## 2019-07-15 RX ADMIN — PYRIDOXINE HYDROCHLORIDE 1 MLS/HR: 100 INJECTION, SOLUTION INTRAMUSCULAR; INTRAVENOUS at 01:08

## 2019-07-15 NOTE — OPR
Operative Report


Planned Procedure


Free Text/Dictation


Pre-Operative Diagnosis:


1.  39 2/7 weeks gestation


2.  Suspected macrosomia


Post-Operative Diagnosis: Same + PPH from uterine atony 


Procedure(s): low transverse  section


Surgeon: Dr. Mak Assistant: Dr. Arvizu


Anesthesia: Spinal


Findings: infant in OP presentation weighing 3580 g with Apgars of 8 and 9 at 1 


and 10 minutes respectively. no nuchal cord.  Normal appearing tubes and 


ovaries. Placenta intact w 3VC.


Indications: Internal request after induction.  Patient was noted to be complete


by RN.  She had received misoprostol starting on 2019.  She was started on 


Pitocin on 7/15/2019.


Description of Procedure:


After informed consent was obtained, the patient was taken to the operating room


where anesthesia


was initiated. The patient was placed in the dorsal, supine position with left 


lateral tilt to


avoid aorto-caval compression. Prior to the beginning of the procedure, 


sequential compression


devices were placed on the patient's lower extremities and activated. A so 


catheter was placed


in the bladder without difficulty.  Clindamycin and gentamicin were given were 


administered. A surgical pause


identified the patient and procedure and all parties were in agreement.





The patient's abdomen was prepped and draped in sterile fashion. An Allis skin 


test was


performed to ensure adequate anesthesia. A [12 cm Pffannenstiel skin incision 


was made 2 cm


above the pubic symphysis using a scalpel and carried down through to the level 


of the


fascia using bovie. The fascia was scored in the midline and extended 


bilaterally via angel de leon fashion. 


The rectus muscles were then  in the midline, and the peritoneum was 


tented up and entered 


bluntly. The peritoneal incision was extended superiorly and inferiorly with 


good visualization of the bladder


An Kai retractor was then inserted. Hysterotomy was made in a semicircular 


fashion using a 


scalpel and extended bilaterally with blunt dissection. Amniotomy was performed 


and fluid was noted to 


be clear. The infant's head was then grasped while RN elevated from below, 


elevated to the level of the incision and delivered atraumatically 


in OP presentation, followed by the body which was delivered without difficulty.


 The infant was suctioned, cord 


clamped x 2 and cut. Infant was handed to the nursing staff. Cord blood was 


collected.


The placenta was expressed manually. The uterus was cleared of all clots and


debris. The uterus was exteriorized and the uterine incision was repaired with 


0-monocyl in a running locking fashion. A


second layer of 0-monocyl was utilizied. Hemabate, Methergine and Pitocin were 


given for uterine atony. 


Surgicel was placed in the left lateral incision and the peritoneum 


reapproximated.


Hemostasis was noted. The Kai retractor was removed. The uterine 


incision was noted to be hemostatic. The fascia was reapproximated with 0-


monocryl. 


The subcutaneous tissue was closed with 2-0 monocryl The skin was reapproximated


 with 3-0monocryl 


Steri strips were placed on the incision.  The uterus was firm at the end of the


 procedure.  All counts were 


correct x 2 at the end of the procedure.


Estimated Blood Loss: 1700 ml; 2 units PRBC given


Drains: So catheter with 150 ml of clear urine at end of procedure


Fluids Given: 3 L 


Specimens/ Cultures: cord blood, cord gases


Implants: Surgicel


Complications: None


Disposition: recovery in hemodynamically stable.


Condition:  stable


Procedure date


Jul 15, 2019


Procedure(s)





Performed by


see signature line


Assistant:  ARMAND ARVIZU MD


Pre-procedure diagnosis


elective maternal request


                 Bunrl2Nf
Anesthesia Type:  Fhuvr3h
spinal








Post-Procedure


Post-procedure diagnosis


same + PPH for uterine atony


Findings


Live Baby [], Apgars [] and [], weight [], position [], [] presentation []cord.


Estimated Blood Loss:  other (1700 ml)


Specimen(s)


none


Grafts/Implant(s)


none


Complication(s)


none


Procedure Description


see above











CHANDRIKA MAK MD             Jul 15, 2019 18:28

## 2019-07-15 NOTE — PREAC
Date/Time of Note


Date/Time of Note


DATE: 7/15/19 


TIME: 02:29





Anesthesia Eval and Record


Evaluation


Time Pre-Procedure Interview


DATE: 7/15/19 


TIME: 01:37


Age


29


Sex


female


NPO:  8 hrs


Preoperative diagnosis


iup @ 39 wks., , labor, macrosomia


Planned procedure


esdras





Past Medical History


Past Medical History:  Includes


Pregnancy:  : (4), Para: (2), Gestational age: (39 wks.)





Surgery & Anesthesia Issues


No known issue





Meds


Anticoagulation:  No


Beta Blocker within 24 hr:  No


Reason Beta Blocker not given:  Pt. not on B-Blocker


Active Scripts


Acetaminophen* (Tylophen*) 500 Mg Capsule, 2 CAP PO Q8H PRN for PAIN AND OR 


ELEVATED TEMP, #20 CAP


   Prov:ЕКАТЕРИНА AMAYA PA-C         18


Reported Medications


Ferrous Sulfate (Iron) 134 Mg Tablet, 134 MG PO DAILY, TAB


   19


PNV115-Iron Fumarate-FA-DSS (Prenatal 19) 1 Each Tablet, 1 TAB PO DAILY, TAB


   18


[None]   No Conflict Check


   11/1/10





Current Medications


Lactated Ringer's 1,000 ml @  125 mls/hr Q8H IV  Last administered on 19at 


22:31; Admin Dose 125 MLS/HR;  Start 19 at 23:37


Butorphanol Tartrate (Stadol) 2 mg Q2H  PRN IV .PAIN SCALE 6-10 Last 


administered on 19at 23:13; Admin Dose 2 MG;  Start 19 at 00:00


Lidocaine (Xylocaine 1% (Mpf)) 30 ml ONCE PRN INJ .EPISIOTOMY;  Start 19 at


 00:00


Oxytocin/Lactated Ringer's 500 ml @  500 mls/hr ONCE POST PARTUM IV ;  Start 


19 at 00:00


Oxytocin/Lactated Ringer's 500 ml @  125 mls/hr POST PARTUM IV ;  Start 19 


at 00:00


Lactated Ringer's 1,000 ml @  2,000 mls/hr Q30M PRN IV .ANESTHESIA Last 


administered on 7/15/19at 02:16; Admin Dose 2,000 MLS/HR;  Start 19 at 


23:37


Oxytocin/Lactated Ringer's 500 ml @ 0 mls/hr ONCE PRN IV .VAGINAL BLEEDING;  


Start 19 at 00:00


Methylergonovine Maleate (Methergine) 0.2 mg ONCE PRN IM .VAGINAL BLEEDING;  


Start 19 at 00:00


Carboprost Tromethamine (Hemabate) 250 mcg ONCE PRN IM .VAGINAL BLEEDING;  Start


 19 at 00:00


Misoprostol (Cytotec) 1,000 mcg ONCE PRN UT .VAGINAL BLEEDING;  Start 19 at


 00:00


Mineral Oil (Muri-Lube) 20 ml ONCE PRN TOP FOR DELIVERY;  Start 19 at 00:00


Misoprostol (Cytotec 50 Mcg Capsule) 50 mcg Q4H  PRN PO CERVICAL RIPENING Last 


administered on 19at 17:45; Admin Dose 50 MCG;  Start 19 at 01:00;  


Stop 19 at 00:59


Meds reviewed:  Yes





Allergies


Coded Allergies:  


     cefazolin (Verified  Allergy, Intermediate, 19)


   


   itching, hot, sob


Allergies Reviewed:  Yes





Labs/Studies


Labs Reviewed:  Reviewed by anesthesiologist


Result Diagram:  


19 9115





Pregnancy test:  Positive


Studies:  ECG (n/a), CXR (n/a)





Pre-procedure Exam


Airway:  Adequate mouth opening, Adequate thyromental dist


Mallampati:  Mallampati II


Teeth:  Normal


Lung:  Normal


Heart:  Normal





ASA Physical Status


ASA physical status:  2


Emergency:  E





Planned Anesthetic


Neuraxial:  Epidural





Planned Pain Management


Epidural, Parenteral pain med, Local by surgeon





Pre-operative Attestations


Prior to commencing anesthesia and surgery, the patient was re-evaluated, there 


was verification of:


*The patient's identity


*The results of appropriate recent lab work and preoperative vital signs


*The above evaluation not changing prior to induction


*Anesthetic plan, risk benefits, alternative and complications discussed with 


patient/family; questions answered; patient/family understands, accepts and 


wishes to proceed.


 used











CAS HANSON MD           Jul 15, 2019 02:34

## 2019-07-15 NOTE — PREAC
Date/Time of Note


Date/Time of Note


DATE: 7/15/19 


TIME: 16:39





Anesthesia Eval and Record


Evaluation


Time Pre-Procedure Interview


DATE: 7/15/19 


TIME: 16:39


Age


29


Sex


female


NPO:  8 hrs


Preoperative diagnosis


elective


Planned procedure


c section





Past Medical History


Past Medical History:  Includes


Heme:  Anemia


Pregnancy:  : (2)





Surgery & Anesthesia Issues


No known issue





Meds


Anticoagulation:  No


Beta Blocker within 24 hr:  No


Reason Beta Blocker not given:  Pt. not on B-Blocker


Active Scripts


Acetaminophen* (Tylophen*) 500 Mg Capsule, 2 CAP PO Q8H PRN for PAIN AND OR 


ELEVATED TEMP, #20 CAP


   Prov:ЕКАТЕРИНА AMAYA PA-C         18


Reported Medications


Ferrous Sulfate (Iron) 134 Mg Tablet, 134 MG PO DAILY, TAB


   19


PNV115-Iron Fumarate-FA-DSS (Prenatal 19) 1 Each Tablet, 1 TAB PO DAILY, TAB


   18


[None]   No Conflict Check


   11/1/10





Current Medications


Lactated Ringer's 1,000 ml @  125 mls/hr Q8H IV  Last administered on 7/15/19at 


09:42; Admin Dose 125 MLS/HR;  Start 19 at 23:37


Butorphanol Tartrate (Stadol) 2 mg Q2H  PRN IV .PAIN SCALE 6-10 Last 


administered on 19at 23:13; Admin Dose 2 MG;  Start 19 at 00:00


Lidocaine (Xylocaine 1% (Mpf)) 30 ml ONCE PRN INJ .EPISIOTOMY;  Start 19 at


 00:00


Oxytocin/Lactated Ringer's 500 ml @  500 mls/hr ONCE POST PARTUM IV ;  Start 


19 at 00:00


Oxytocin/Lactated Ringer's 500 ml @  125 mls/hr POST PARTUM IV ;  Start 19 


at 00:00


Lactated Ringer's 1,000 ml @  2,000 mls/hr Q30M PRN IV .ANESTHESIA Last 


administered on 7/15/19at 01:08; Admin Dose 2,000 MLS/HR;  Start 19 at 


23:37


Oxytocin/Lactated Ringer's 500 ml @ 0 mls/hr ONCE PRN IV .VAGINAL BLEEDING;  


Start 19 at 00:00


Methylergonovine Maleate (Methergine) 0.2 mg ONCE PRN IM .VAGINAL BLEEDING;  


Start 19 at 00:00


Carboprost Tromethamine (Hemabate) 250 mcg ONCE PRN IM .VAGINAL BLEEDING;  Start


 19 at 00:00


Misoprostol (Cytotec) 1,000 mcg ONCE PRN MN .VAGINAL BLEEDING;  Start 19 at


 00:00


Mineral Oil (Muri-Lube) 20 ml ONCE PRN TOP FOR DELIVERY;  Start 19 at 00:00


Misoprostol (Cytotec 50 Mcg Capsule) 50 mcg Q4H  PRN PO CERVICAL RIPENING Last 


administered on 19at 17:45; Admin Dose 50 MCG;  Start 19 at 01:00;  


Stop 19 at 00:59


Naloxone HCl (Narcan) 0.1 mg Q2M  PRN IV .RESP RATE;  Start 7/15/19 at 03:00;  


Stop 19 at 02:59


Ondansetron HCl (Zofran Inj) 4 mg Q6H  PRN IV .NAUSEA/VOMITING;  Start 7/15/19 


at 03:00;  Stop 19 at 02:59


Fentanyl/ Ropivacaine 100 ml EPIDURAL INFUSION EPI  Last administered on 


7/15/19at 14:40; Admin Dose 100 ML;  Start 7/15/19 at 03:00


Oxytocin/Lactated Ringer's 500 ml @ 0 mls/hr FOR AUGMENTATION IV ;  Start 


7/15/19 at 11:30


Oxytocin/Lactated Ringer's 500 ml @ 0 mls/hr FOR INDUCTION IV  Last administered


 on 7/15/19at 11:37; Admin Dose 1 MLS/HR;  Start 7/15/19 at 11:30


Clindamycin HCl/ Dextrose 50 ml @ 50 mls/hr ONCE IVPB ;  Start 7/15/19 at 16:30;


  Stop 7/15/19 at 17:29


Gentamicin Sulfate 320 mg/ Sodium Chloride 108 ml @  108 mls/hr ONCE IVPB ;  


Start 7/15/19 at 16:30


Oxytocin/Lactated Ringer's 500 ml @  125 mls/hr POST PARTUM IV ;  Start 7/15/19 


at 16:30


Meds reviewed:  Yes





Allergies


Coded Allergies:  


     cefazolin (Verified  Allergy, Intermediate, 19)


   


   itching, hot, sob


Allergies Reviewed:  Yes





Labs/Studies


Labs Reviewed:  Reviewed by anesthesiologist


Result Diagram:  


19 7714





Pregnancy test:  Positive





Pre-procedure Exam


Airway:  Adequate mouth opening


Mallampati:  Mallampati I


Teeth:  Normal


Lung:  Normal


Heart:  Normal





ASA Physical Status


ASA physical status:  2


Emergency:  None





Planned Anesthetic


Neuraxial:  Epidural





Pre-operative Attestations


Prior to commencing anesthesia and surgery, the patient was re-evaluated, there 


was verification of:


*The patient's identity


*The results of appropriate recent lab work and preoperative vital signs


*The above evaluation not changing prior to induction


*Anesthetic plan, risk benefits, alternative and complications discussed with 


patient/family; questions answered; patient/family understands, accepts and 


wishes to proceed.











AMANDA INGRAM MD            Jul 15, 2019 16:39

## 2019-07-15 NOTE — PN
Date/Time of Note


Date/Time of Note


DATE: 7/15/19 


TIME: 11:29





OB Subjective


Subjective


Subjective


Patient without complaints.  Status post epidural.


Electronic fetal monitor category 1/150/moderate variability/positive 


accelerations/no D cells


Tocometer contractions Q7


SVE 6/75/-5/mid/moderate


AROM-clear 





Assessment/Plan:


1.  Intrauterine pregnancy at 39.1 weeks gestation-continue induction.  Start 


Pitocin. IUPC placed as well as FSE.  Monitor for labor dystocia


2. suspected macrosomia-iol











MILESTONE,CHANDRIKA GARRISON             Jul 15, 2019 11:30

## 2019-07-16 VITALS — DIASTOLIC BLOOD PRESSURE: 68 MMHG | RESPIRATION RATE: 18 BRPM | HEART RATE: 68 BPM | SYSTOLIC BLOOD PRESSURE: 101 MMHG

## 2019-07-16 VITALS — HEART RATE: 66 BPM | RESPIRATION RATE: 18 BRPM | SYSTOLIC BLOOD PRESSURE: 101 MMHG | DIASTOLIC BLOOD PRESSURE: 59 MMHG

## 2019-07-16 VITALS — RESPIRATION RATE: 17 BRPM | SYSTOLIC BLOOD PRESSURE: 94 MMHG | DIASTOLIC BLOOD PRESSURE: 56 MMHG | HEART RATE: 68 BPM

## 2019-07-16 VITALS — DIASTOLIC BLOOD PRESSURE: 56 MMHG | SYSTOLIC BLOOD PRESSURE: 104 MMHG | RESPIRATION RATE: 17 BRPM | HEART RATE: 76 BPM

## 2019-07-16 VITALS — DIASTOLIC BLOOD PRESSURE: 50 MMHG | RESPIRATION RATE: 18 BRPM | HEART RATE: 80 BPM | SYSTOLIC BLOOD PRESSURE: 98 MMHG

## 2019-07-16 VITALS — DIASTOLIC BLOOD PRESSURE: 58 MMHG | SYSTOLIC BLOOD PRESSURE: 107 MMHG | HEART RATE: 78 BPM | RESPIRATION RATE: 17 BRPM

## 2019-07-16 VITALS — RESPIRATION RATE: 17 BRPM | SYSTOLIC BLOOD PRESSURE: 92 MMHG | DIASTOLIC BLOOD PRESSURE: 51 MMHG | HEART RATE: 77 BPM

## 2019-07-16 VITALS — SYSTOLIC BLOOD PRESSURE: 107 MMHG | DIASTOLIC BLOOD PRESSURE: 58 MMHG | RESPIRATION RATE: 17 BRPM | HEART RATE: 78 BPM

## 2019-07-16 VITALS — SYSTOLIC BLOOD PRESSURE: 114 MMHG | HEART RATE: 76 BPM | RESPIRATION RATE: 18 BRPM | DIASTOLIC BLOOD PRESSURE: 66 MMHG

## 2019-07-16 VITALS — RESPIRATION RATE: 16 BRPM | DIASTOLIC BLOOD PRESSURE: 55 MMHG | HEART RATE: 80 BPM | SYSTOLIC BLOOD PRESSURE: 102 MMHG

## 2019-07-16 VITALS — HEART RATE: 96 BPM | DIASTOLIC BLOOD PRESSURE: 60 MMHG | SYSTOLIC BLOOD PRESSURE: 99 MMHG | RESPIRATION RATE: 18 BRPM

## 2019-07-16 VITALS — HEART RATE: 77 BPM | DIASTOLIC BLOOD PRESSURE: 61 MMHG | SYSTOLIC BLOOD PRESSURE: 113 MMHG | RESPIRATION RATE: 14 BRPM

## 2019-07-16 VITALS — HEART RATE: 77 BPM | SYSTOLIC BLOOD PRESSURE: 98 MMHG | DIASTOLIC BLOOD PRESSURE: 56 MMHG | RESPIRATION RATE: 18 BRPM

## 2019-07-16 LAB
ABNORMAL IP MESSAGE: 1
ADD MAN DIFF?: NO
ANISOCYTOSIS: (no result) (ref 0–0)
BAND NEUTROPHILS #M: 2 10^3/UL (ref 0–0.6)
BAND NEUTROPHILS % (M): 10 % (ref 0–4)
BASOPHIL #: 0 10^3/UL (ref 0–0.1)
BASOPHILS %: 0.1 % (ref 0–2)
CBV BASE EXCESS: -7.7 MMOL/L
CBV COHB: 1.6 %
CBV OXYGEN SAT: 63.3 MMHG
CBV TOTAL HEMGLOBIN: 14.2 G/DL
CORD BLOOD VENOUS PO2: 27.8 MMHG (ref 15–45)
EOSINOPHILS #: 0 10^3/UL (ref 0–0.5)
EOSINOPHILS %: 0 % (ref 0–7)
FIO2: 21 %
FRACTION OXYHGB CORD VENOUS: 61.1 %
GIANT THROMBO% (M): 2 % (ref 0–0)
HEMATOCRIT: 21.3 % (ref 37–47)
HEMOGLOBIN: 6.7 G/DL (ref 12–16)
IMMATURE GRANS #M: 0.23 10^3/UL (ref 0–0.03)
IMMATURE GRANS % (M): 1.1 % (ref 0–0.43)
IMMEDIATE SPIN CROSSMATCH: 1 5
LYMPHOCYTES #: 1.1 10^3/UL (ref 0.8–2.9)
LYMPHOCYTES #M: 0.8 10^3/UL (ref 0.8–2.9)
LYMPHOCYTES % (M): 4 % (ref 15–51)
LYMPHOCYTES %: 5.3 % (ref 15–51)
MEAN CORPUSCULAR HEMOGLOBIN: 26.8 PG (ref 29–33)
MEAN CORPUSCULAR HGB CONC: 31.5 G/DL (ref 32–37)
MEAN CORPUSCULAR VOLUME: 85.2 FL (ref 82–101)
MEAN PLATELET VOLUME: 11.2 FL (ref 7.4–10.4)
METHGB CORD VENOUS: 1.9 %
MICROCYTOSIS: (no result) (ref 0–0)
MODE: (no result)
MONOCYTE #: 0.9 10^3/UL (ref 0.3–0.9)
MONOCYTE #M: 1.2 10^3/UL (ref 0.3–0.9)
MONOCYTES % (M): 6 % (ref 0–11)
MONOCYTES %: 4.4 % (ref 0–11)
NEUTROPHIL #: 18.6 10^3/UL (ref 1.6–7.5)
NEUTROPHILS %: 89.1 % (ref 39–77)
NUCLEATED RED BLOOD CELLS #: 0 10^3/UL (ref 0–0)
NUCLEATED RED BLOOD CELLS%: 0 /100WBC (ref 0–0)
PATH REVIEW?: YES
PLATELET COUNT: 140 10^3/UL (ref 140–415)
PLATELET ESTIMATE: NORMAL
POIKILOCYTOSIS: (no result) (ref 0–0)
POLYCHROMASIA: (no result) (ref 0–0)
POSITIVE DIFF: (no result)
RED BLOOD COUNT: 2.5 10^6/UL (ref 4.2–5.4)
RED CELL DISTRIBUTION WIDTH: 15.2 % (ref 11.5–14.5)
SAMPLE TYPE: (no result)
SEG NEUT #M: 17.1 10^3/UL (ref 1.6–7.5)
SEGMENTED NEUTROPHILS (M) %: 80 % (ref 39–77)
SMUDGE%M: 2 % (ref 0–0)
WHITE BLOOD COUNT: 20.9 10^3/UL (ref 4.8–10.8)

## 2019-07-16 RX ADMIN — KETOROLAC TROMETHAMINE PRN MG: 30 INJECTION, SOLUTION INTRAMUSCULAR at 09:19

## 2019-07-16 RX ADMIN — HYDROCODONE BITARTRATE AND ACETAMINOPHEN SCH TAB: 5; 325 TABLET ORAL at 22:00

## 2019-07-16 RX ADMIN — KETOROLAC TROMETHAMINE 1 MG: 30 INJECTION, SOLUTION INTRAMUSCULAR at 15:00

## 2019-07-16 RX ADMIN — DOCUSATE SODIUM SCH MG: 100 CAPSULE, LIQUID FILLED ORAL at 21:27

## 2019-07-16 RX ADMIN — PYRIDOXINE HYDROCHLORIDE SCH MLS/HR: 100 INJECTION, SOLUTION INTRAMUSCULAR; INTRAVENOUS at 02:03

## 2019-07-16 RX ADMIN — PYRIDOXINE HYDROCHLORIDE 1 MLS/HR: 100 INJECTION, SOLUTION INTRAMUSCULAR; INTRAVENOUS at 02:03

## 2019-07-16 RX ADMIN — PYRIDOXINE HYDROCHLORIDE SCH MLS/HR: 100 INJECTION, SOLUTION INTRAMUSCULAR; INTRAVENOUS at 09:20

## 2019-07-16 RX ADMIN — IBUPROFEN 1 MG: 800 TABLET, FILM COATED ORAL at 21:27

## 2019-07-16 RX ADMIN — KETOROLAC TROMETHAMINE 1 MG: 30 INJECTION, SOLUTION INTRAMUSCULAR at 09:19

## 2019-07-16 RX ADMIN — IBUPROFEN SCH MG: 800 TABLET ORAL at 21:27

## 2019-07-16 RX ADMIN — DOCUSATE SODIUM 1 MG: 100 CAPSULE, LIQUID FILLED ORAL at 21:27

## 2019-07-16 RX ADMIN — PYRIDOXINE HYDROCHLORIDE 1 MLS/HR: 100 INJECTION, SOLUTION INTRAMUSCULAR; INTRAVENOUS at 09:20

## 2019-07-16 RX ADMIN — HYDROCODONE BITARTRATE AND ACETAMINOPHEN 1 TAB: 5; 325 TABLET ORAL at 22:00

## 2019-07-16 RX ADMIN — KETOROLAC TROMETHAMINE PRN MG: 30 INJECTION, SOLUTION INTRAMUSCULAR at 15:00

## 2019-07-16 NOTE — OPPN
Date/Time of Note


Date/Time of Note


DATE: 7/16/19 


TIME: 08:19





Anesthesia Follow up


Anesthesia Follow up


Last documented vital signs





Vital Signs


  Date      Temp  Pulse  Resp  B/P (MAP)   Pulse Ox  O2          O2 Flow    FiO2


Time                                                 Delivery    Rate


   7/16/19  98.3     80    18  98/50 (66)        96  Room Air


     04:05





Respiratory function:  WNL


Cardiovascular function:  WNL


Comments


A 29 year s/p duramorph for post op pain, post op day #1 is fine, no headache, 


pain, N/V, itching, neural deficit.











AMANDA INGRAM MD            Jul 16, 2019 08:19

## 2019-07-16 NOTE — PAC
Date/Time of Note


Date/Time of Note


DATE: 7/16/19 


TIME: 08:18





Post-Anesthesia Notes


Post-Anesthesia Note


Last documented vital signs





Vital Signs


  Date      Temp  Pulse  Resp  B/P (MAP)   Pulse Ox  O2          O2 Flow    FiO2


Time                                                 Delivery    Rate


   7/16/19  98.3     80    18  98/50 (66)        96  Room Air


     04:05





Activity:  WNL


Respiratory function:  WNL


Cardiovascular function:  WNL


Mental status:  Baseline


Pain reasonably controlled:  Yes


Hydration appropriate:  Yes


Nausea/Vomiting absent:  No











AMANDA INGRAM MD            Jul 16, 2019 08:19

## 2019-07-17 VITALS — RESPIRATION RATE: 18 BRPM | DIASTOLIC BLOOD PRESSURE: 64 MMHG | HEART RATE: 59 BPM | SYSTOLIC BLOOD PRESSURE: 98 MMHG

## 2019-07-17 VITALS — HEART RATE: 66 BPM | DIASTOLIC BLOOD PRESSURE: 83 MMHG | RESPIRATION RATE: 16 BRPM | SYSTOLIC BLOOD PRESSURE: 115 MMHG

## 2019-07-17 VITALS — RESPIRATION RATE: 18 BRPM | DIASTOLIC BLOOD PRESSURE: 71 MMHG | HEART RATE: 71 BPM | SYSTOLIC BLOOD PRESSURE: 114 MMHG

## 2019-07-17 VITALS — DIASTOLIC BLOOD PRESSURE: 80 MMHG | RESPIRATION RATE: 17 BRPM | HEART RATE: 78 BPM | SYSTOLIC BLOOD PRESSURE: 120 MMHG

## 2019-07-17 LAB
% IRON SATURATION: 6 % SAT (ref 22–52)
ADD MAN DIFF?: NO
BASOPHIL #: 0 10^3/UL (ref 0–0.1)
BASOPHILS %: 0.1 % (ref 0–2)
EOSINOPHILS #: 0 10^3/UL (ref 0–0.5)
EOSINOPHILS %: 0.2 % (ref 0–7)
FERRITIN: 32.1 NG/ML (ref 6.2–137)
HEMATOCRIT: 22 % (ref 37–47)
HEMOGLOBIN: 7 G/DL (ref 12–16)
IMMATURE GRANS #M: 0.15 10^3/UL (ref 0–0.03)
IMMATURE GRANS % (M): 0.9 % (ref 0–0.43)
IRON: 28 UG/DL (ref 35–150)
LYMPHOCYTES #: 1.9 10^3/UL (ref 0.8–2.9)
LYMPHOCYTES %: 10.9 % (ref 15–51)
MEAN CORPUSCULAR HEMOGLOBIN: 27.2 PG (ref 29–33)
MEAN CORPUSCULAR HGB CONC: 31.8 G/DL (ref 32–37)
MEAN CORPUSCULAR VOLUME: 85.6 FL (ref 82–101)
MEAN PLATELET VOLUME: 11.5 FL (ref 7.4–10.4)
MONOCYTE #: 1.2 10^3/UL (ref 0.3–0.9)
MONOCYTES %: 7 % (ref 0–11)
NEUTROPHIL #: 13.7 10^3/UL (ref 1.6–7.5)
NEUTROPHILS %: 80.9 % (ref 39–77)
NUCLEATED RED BLOOD CELLS #: 0 10^3/UL (ref 0–0)
NUCLEATED RED BLOOD CELLS%: 0 /100WBC (ref 0–0)
PLATELET COUNT: 118 10^3/UL (ref 140–415)
RED BLOOD COUNT: 2.57 10^6/UL (ref 4.2–5.4)
RED CELL DISTRIBUTION WIDTH: 15.5 % (ref 11.5–14.5)
TOTAL IRON BINDING CAPACITY: 450 UG/DL (ref 241–421)
WHITE BLOOD COUNT: 16.9 10^3/UL (ref 4.8–10.8)

## 2019-07-17 RX ADMIN — FERROUS SULFATE TAB 325 MG (65 MG ELEMENTAL FE) SCH MG: 325 (65 FE) TAB at 21:52

## 2019-07-17 RX ADMIN — HYDROCODONE BITARTRATE AND ACETAMINOPHEN 1 TAB: 5; 325 TABLET ORAL at 14:00

## 2019-07-17 RX ADMIN — IBUPROFEN SCH MG: 800 TABLET ORAL at 13:51

## 2019-07-17 RX ADMIN — HYDROCODONE BITARTRATE AND ACETAMINOPHEN SCH TAB: 5; 325 TABLET ORAL at 06:00

## 2019-07-17 RX ADMIN — DOCUSATE SODIUM 1 MG: 100 CAPSULE, LIQUID FILLED ORAL at 21:52

## 2019-07-17 RX ADMIN — FERROUS SULFATE TAB 325 MG (65 MG ELEMENTAL FE) SCH MG: 325 (65 FE) TAB at 14:09

## 2019-07-17 RX ADMIN — HYDROCODONE BITARTRATE AND ACETAMINOPHEN SCH TAB: 5; 325 TABLET ORAL at 14:00

## 2019-07-17 RX ADMIN — HYDROCODONE BITARTRATE AND ACETAMINOPHEN 1 TAB: 5; 325 TABLET ORAL at 21:52

## 2019-07-17 RX ADMIN — IBUPROFEN 1 MG: 800 TABLET, FILM COATED ORAL at 05:29

## 2019-07-17 RX ADMIN — FERROUS SULFATE TAB 325 MG (65 MG ELEMENTAL FE) 1 MG: 325 (65 FE) TAB at 14:09

## 2019-07-17 RX ADMIN — FERROUS SULFATE TAB 325 MG (65 MG ELEMENTAL FE) 1 MG: 325 (65 FE) TAB at 21:52

## 2019-07-17 RX ADMIN — IBUPROFEN 1 MG: 800 TABLET, FILM COATED ORAL at 21:52

## 2019-07-17 RX ADMIN — DOCUSATE SODIUM 1 MG: 100 CAPSULE, LIQUID FILLED ORAL at 09:23

## 2019-07-17 RX ADMIN — DOCUSATE SODIUM SCH MG: 100 CAPSULE, LIQUID FILLED ORAL at 21:52

## 2019-07-17 RX ADMIN — IBUPROFEN SCH MG: 800 TABLET ORAL at 21:52

## 2019-07-17 RX ADMIN — IBUPROFEN 1 MG: 800 TABLET, FILM COATED ORAL at 13:51

## 2019-07-17 RX ADMIN — HYDROCODONE BITARTRATE AND ACETAMINOPHEN 1 TAB: 5; 325 TABLET ORAL at 06:00

## 2019-07-17 RX ADMIN — HYDROCODONE BITARTRATE AND ACETAMINOPHEN SCH TAB: 5; 325 TABLET ORAL at 21:52

## 2019-07-17 RX ADMIN — CYANOCOBALAMIN 1 MCG: 1000 INJECTION INTRAMUSCULAR; SUBCUTANEOUS at 14:05

## 2019-07-17 RX ADMIN — IBUPROFEN SCH MG: 800 TABLET ORAL at 05:29

## 2019-07-17 RX ADMIN — DOCUSATE SODIUM SCH MG: 100 CAPSULE, LIQUID FILLED ORAL at 09:23

## 2019-07-17 NOTE — PN
Date/Time of Note


Date/Time of Note


DATE: 19 


TIME: 12:17





OB Subjective


Subjective


Subjective


POD#2


Patient is doing well. She denies nausea, vomiting, shortness of breath, chest 


pain, headache. She has been ambulating without difficulty, tolerating regular 


diet. Pain is well controlled on current medications





OB Objective


Objective


Objective





                          VS - Last 72 Hours, by Label


  Date      Temp  Pulse  Resp  B/P (MAP)   Pulse Ox  O2          O2 Flow    FiO2


Time                                                 Delivery    Rate


   19  98.0     71    18      114/71            Room Air


     08:00                           (85)


   19  98.1     59    18  98/64 (75)            Room Air


     04:05


   19  98.0     76    18      114/66            Room Air


     19:45                           (82)


   19  98.2     68    18      101/68


     16:39                           (79)


   19  98.7     68    17  94/56 (69)            Room Air


     13:33


   19  98.7     66    18      101/59            Room Air


     12:33                           (73)


   19  98.1     78    17      107/58        95  Room Air


     12:20                           (74)


   19  98.1     78    17      107/58            Room Air


     11:46                           (74)


   19  98.2     77    14      113/61            Room Air


     10:46                           (78)


   19  98.9     80    16      102/55


     10:31                           (71)


   19  98.7     76    17      104/56            Room Air


     10:16                           (72)


   19  98.3     77    17  92/51 (65)        96  Room Air


     09:00


   19  98.3     80    18  98/50 (66)        96  Room Air


     04:05


   19  98.2     77    18  98/56 (70)        97  Room Air


     00:15


   7/15/19  99.2     96    19      108/67        96  Room Air


     21:30                           (81)


   7/15/19  99.2    107    20      101/56        97  Room Air


     20:58                           (71)


   7/15/19  99.2    111    18  92/54 (67)        97  Room Air


     20:40


   7/15/19  99.3    111    19  92/54 (67)        95  Room Air


     20:26


   7/15/19  99.4    114    20  96/55 (69)        97  Room Air


     20:10


   7/15/19  99.4    115    22  93/53 (66)        97  Room Air


     19:50


   7/15/19  98.9    111    20  92/56 (68)        97  Room Air


     19:40


   7/15/19  98.9    109    22  95/60 (72)        97  Room Air


     19:25





General: AAO X 3, comfortable, NAD, appropriate mood and affect.


ABD: +BS. Soft, non-tender. Uterus 2 cm below umbilicus 


Incision: Clear, dry, intact. No erythema, drainage or induration. 


Flank: No CVA tenderness (B/L)


LE: Mild edema. No clubbing, cyanosis, thigh or calf tenderness (B/L). Homans 


'sign is negative





OB  Assessment/Plan


Other plan:


29 years old  4 para 2-0-1-2 s/p  delivery at 39 weeks and 2 


days.  POD#2


-  AF, VSS


-  Baby is doing well, at bed side. She is bonding well


-  Continue Postpartum care


-  Possible discharge home tomorrow


-  Rx and instruction given


-  Follow up in one and 6 weeks





2) severe anemia: Her hemoglobin on admission was 8.6, currently 7  


-  She has received 3 units of packed RBC blood, today's hemoglobin is 7.  She 


has no symptom.


-  Iron profile and serum ferritin ordered.  Consider IV iron before discharge 


to homex2


-  Vitamin B12 1000 mcg IM x1


-  I did discussed with patient she need to take ferrous sulfate 325 mg 3 times 


daily for 2 months, then daily for another 3 months with the new prenatal 


vitamin











JAMES SHERIFF                  2019 12:21

## 2019-07-17 NOTE — PN
Date/Time of Note


Date/Time of Note


DATE: 19 


TIME: 12:10





OB Subjective


Subjective


Subjective


Late entry note.  Patient seen on 2019 


POD#1


Patient is doing well. She denies nausea, vomiting, shortness of breath, chest 


pain, headache. She has been ambulating without difficulty, tolerating regular 


diet. Pain is well controlled on current medications





OB Objective


Objective


Objective





Vital Signs


  Date      Temp  Pulse  Resp  B/P (MAP)   Pulse Ox  O2          O2 Flow    FiO2


Time                                                 Delivery    Rate


   19  98.0     71    18      114/71            Room Air


     08:00                           (85)


   19                                       95


     12:20





General: AAO X 3, comfortable, NAD, appropriate mood and affect.


Heart: RRR +S1, +S2, no murmurs.


Lungs: Clear to auscultation (B/L), no rales, rhonchi or wheezing.


ABD: +BS. Soft, non-tender. Uterus 2 cm below umbilicus 


Incision: Clear, dry, intact. No erythema, drainage or induration. 


Flank: No CVA tenderness (B/L)


LE: Mild edema. No clubbing, cyanosis, thigh or calf tenderness (B/L). Homans 


'sign is negative





OB  Assessment/Plan


Other plan:


29 years old  4 para 2-0-1-2 s/p  delivery at 39 weeks and 2 


days.  POD#1


-  AF, VSS


-  Baby is doing well, at bed side. She is bonding well


-  Continue Postpartum care





2.  Postpartum hemorrhage: She has received a 2 unit of packed packed RBC after 


proposed surgery currently her hemoglobin is less than 7.  Blood transfusion 


with risk-benefit alternative discussed in detail with patient.  She has agreed 


to receive another unit of packed RBC which ordered.  Repeat hemoglobin tomorrow


morning.











JAMES SHERIFF                  2019 12:17

## 2019-07-18 VITALS — RESPIRATION RATE: 18 BRPM | HEART RATE: 60 BPM | DIASTOLIC BLOOD PRESSURE: 65 MMHG | SYSTOLIC BLOOD PRESSURE: 113 MMHG

## 2019-07-18 VITALS — DIASTOLIC BLOOD PRESSURE: 78 MMHG | RESPIRATION RATE: 16 BRPM | SYSTOLIC BLOOD PRESSURE: 115 MMHG | HEART RATE: 54 BPM

## 2019-07-18 RX ADMIN — DOCUSATE SODIUM SCH MG: 100 CAPSULE, LIQUID FILLED ORAL at 09:27

## 2019-07-18 RX ADMIN — HYDROCODONE BITARTRATE AND ACETAMINOPHEN SCH TAB: 5; 325 TABLET ORAL at 13:49

## 2019-07-18 RX ADMIN — DOCUSATE SODIUM 1 MG: 100 CAPSULE, LIQUID FILLED ORAL at 09:27

## 2019-07-18 RX ADMIN — IBUPROFEN SCH MG: 800 TABLET ORAL at 13:49

## 2019-07-18 RX ADMIN — FERROUS SULFATE TAB 325 MG (65 MG ELEMENTAL FE) SCH MG: 325 (65 FE) TAB at 13:48

## 2019-07-18 RX ADMIN — HYDROCODONE BITARTRATE AND ACETAMINOPHEN 1 TAB: 5; 325 TABLET ORAL at 13:49

## 2019-07-18 RX ADMIN — FERROUS SULFATE TAB 325 MG (65 MG ELEMENTAL FE) 1 MG: 325 (65 FE) TAB at 13:48

## 2019-07-18 RX ADMIN — HYDROCODONE BITARTRATE AND ACETAMINOPHEN SCH TAB: 5; 325 TABLET ORAL at 06:14

## 2019-07-18 RX ADMIN — HYDROCODONE BITARTRATE AND ACETAMINOPHEN 1 TAB: 5; 325 TABLET ORAL at 06:14

## 2019-07-18 RX ADMIN — FERROUS SULFATE TAB 325 MG (65 MG ELEMENTAL FE) 1 MG: 325 (65 FE) TAB at 09:27

## 2019-07-18 RX ADMIN — IBUPROFEN 1 MG: 800 TABLET, FILM COATED ORAL at 13:49

## 2019-07-18 RX ADMIN — FERROUS SULFATE TAB 325 MG (65 MG ELEMENTAL FE) SCH MG: 325 (65 FE) TAB at 09:27

## 2019-07-18 RX ADMIN — CLOSTRIDIUM TETANI TOXOID ANTIGEN (FORMALDEHYDE INACTIVATED), CORYNEBACTERIUM DIPHTHERIAE TOXOID ANTIGEN (FORMALDEHYDE INACTIVATED), BORDETELLA PERTUSSIS TOXOID ANTIGEN (GLUTARALDEHYDE INACTIVATED), BORDETELLA PERTUSSIS FILAMENTOUS HEMAGGLUTININ ANTIGEN (FORMALDEHYDE INACTIVATED), BORDETELLA PERTUSSIS PERTACTIN ANTIGEN, AND BORDETELLA PERTUSSIS FIMBRIAE 2/3 ANTIGEN 1 ML: 5; 2; 2.5; 5; 3; 5 INJECTION, SUSPENSION INTRAMUSCULAR at 09:00

## 2019-07-18 RX ADMIN — IBUPROFEN 1 MG: 800 TABLET, FILM COATED ORAL at 06:15

## 2019-07-18 RX ADMIN — IBUPROFEN SCH MG: 800 TABLET ORAL at 06:15

## 2019-07-18 NOTE — QN
Documentation


Comment


Patient ambulating.  Denies any symptom.  Denies any dizziness, lightheadedness,


shortness of breath or chest pain.  Is not breast-feeding.  Declined breast-


feeding.  Denies any nausea vomiting.  Passed flatus.  Vaginal bleeding 


decreased.











                          VS - Last 72 Hours, by Label


  Date      Temp  Pulse  Resp  B/P (MAP)   Pulse Ox  O2          O2 Flow    FiO2


Time                                                 Delivery    Rate


   19  98.2     54    16      115/78            Room Air


     08:30                           (90)


   19  98.1     60    18      113/65            Room Air


     04:00                           (81)


   19  98.2     78    17      120/80            Room Air


     20:00                           (93)


   19  98.4     66    16      115/83


     16:30                           (94)


   19  98.4     66    16      115/83            Room Air


     16:00                           (94)


   19  98.0     71    18      114/71            Room Air


     08:00                           (85)


   19  98.1     59    18  98/64 (75)            Room Air


     04:05


   19  98.0     76    18      114/66            Room Air


     19:45                           (82)


   19  98.2     68    18      101/68


     16:39                           (79)


   19  98.7     68    17  94/56 (69)            Room Air


     13:33


   19  98.7     66    18      101/59            Room Air


     12:33                           (73)


   19  98.1     78    17      107/58        95  Room Air


     12:20                           (74)


   19  98.1     78    17      107/58            Room Air


     11:46                           (74)


   19  98.2     77    14      113/61            Room Air


     10:46                           (78)


   19  98.9     80    16      102/55


     10:31                           (71)


   19  98.7     76    17      104/56            Room Air


     10:16                           (72)


   19  98.3     77    17  92/51 (65)        96  Room Air


     09:00


   19  98.3     80    18  98/50 (66)        96  Room Air


     04:05


   19  98.2     77    18  98/56 (70)        97  Room Air


     00:15


   7/15/19  99.2     96    19      108/67        96  Room Air


     21:30                           (81)


   7/15/19  99.2    107    20      101/56        97  Room Air


     20:58                           (71)


   7/15/19  99.2    111    18  92/54 (67)        97  Room Air


     20:40


   7/15/19  99.3    111    19  92/54 (67)        95  Room Air


     20:26


   7/15/19  99.4    114    20  96/55 (69)        97  Room Air


     20:10


   7/15/19  99.4    115    22  93/53 (66)        97  Room Air


     19:50


   7/15/19  98.9    111    20  92/56 (68)        97  Room Air


     19:40


   7/15/19  98.9    109    22  95/60 (72)        97  Room Air


     19:25





Current Medications


 Medications
   Dose
          Sig/Goldie
       Start Time
   Status  Last


 (Trade)       Ordered        Route
 PRN     Stop Time              Admin
Dose


                              Reason                                Admin


 Lactated       1,000 ml @ 
   Q8H
 IV
       19       DC           19


Ringer's       125 mls/hr                    23:37
                       09:20



                                             19 23:36


 Butorphanol
   2 mg           Q2H  PRN
      19       DC           19


Tartrate
                     IV
 .PAIN      00:00
                       23:13



(Stadol)                      SCALE 6-10     7/15/19 21:45


 Lidocaine
     30 ml          ONCE PRN
      19                



(Xylocaine                    INJ
           00:00



1%
  (Mpf))                   .EPISIOTOMY


                500 ml @ 
     ONCE POST      19                



Oxytocin/Lact  500 mls/hr     PARTUM
 IV
    00:00



ated



Ringer's


                500 ml @ 
     POST PARTUM
   19                



Oxytocin/Lact  125 mls/hr     IV
            00:00



ated



Ringer's


 Lactated       1,000 ml @ 
   Q30M PRN
      19       DC           7/15/19


Ringer's       2,000 mls/hr   IV
            23:37
                       01:08



                              .ANESTHESIA    7/15/19 21:45


                500 ml @ 0
    ONCE PRN
      19       DC       



Oxytocin/Lact  mls/hr         IV
 .VAGINAL   00:00



ated
                         BLEEDING       7/15/19 21:45


Ringer's


                0.2 mg         ONCE PRN
      19       DC       



Methylergonov                 IM
 .VAGINAL   00:00



ine
 Maleate
                 BLEEDING       7/15/19 21:45


 (Methergine)


 Carboprost
    250 mcg        ONCE PRN
      19       DC       



Tromethamine
                 IM
 .VAGINAL   00:00



 (Hemabate)                   BLEEDING       7/15/19 21:45


 Misoprostol
   1,000 mcg      ONCE PRN
      19       DC       



 (Cytotec)                    [place]
 .VAGINAL   00:00



                              BLEEDING       7/15/19 21:45


 Mineral Oil
   20 ml          ONCE PRN
      19       DC       



 (Muri-Lube)                  TOP
 FOR       00:00



                              DELIVERY       7/15/19 21:46


 Misoprostol
   50 mcg         Q4H  PRN
      19       DC           19


 (Cytotec 50                  PO
 CERVICAL   01:00
                       17:45



Mcg
 Capsule)                 RIPENING       7/15/19 21:46


 Mineral Oil
                  ONCE  ONCE
    19       DC       



 (Muri-Lube)                  TOP
           23:00



                                             19 23:01


 Fentanyl/
     100 ml @ ud    STK-MED        7/15/19       DC       



Ropivacaine                   ONCE
 .ROUTE
  02:17



                                             7/15/19 02:18


 Naloxone       0.1 mg         Q2M  PRN
      7/15/19       DC       



HCl
                          IV
 .RESP      03:00



(Narcan)                      RATE           19 02:59


 Ondansetron    4 mg           Q6H  PRN
      7/15/19       DC       



HCl
  (Zofran                 IV
            03:00



Inj)                          .NAUSEA/VOMIT  19 02:59


                              ING


 Fentanyl/
     100 ml         EPIDURAL       7/15/19       DC           7/15/19


Ropivacaine                   INFUSION
      03:00
                       14:40



                              EPI
           7/15/19 21:46


                500 ml @ 0
    FOR            7/15/19       DC       



Oxytocin/Lact  mls/hr         AUGMENTATION
  11:30



ated
                         IV
            7/15/19 21:46


Ringer's


                500 ml @ 0
    FOR            7/15/19       DC           7/15/19


Oxytocin/Lact  mls/hr         INDUCTION
     11:30
                       11:37



ated
                         IV
            7/15/19 21:46


Ringer's


 Clindamycin    50 ml @ 50
    ONCE
 IVPB
    7/15/19       DC       



HCl/
          mls/hr                        16:30



Dextrose                                     7/15/19 17:29


 Gentamicin
    108 ml @ 
     ONCE
 IVPB
    7/15/19       DC       



Sulfate 320    108 mls/hr                    16:30



mg/
 Sodium                                  7/15/19 21:46


Chloride


                500 ml @ 
     POST PARTUM
   7/15/19       DC       



Oxytocin/Lact  125 mls/hr     IV
            16:30



ated
                                        7/15/19 21:46


Ringer's


 Citric         30 ml          ONCE  ONCE
    7/15/19       DC           7/15/19


Acid/
 Sodium                 PO
            16:30
                       16:30



Citrate
                                     7/15/19 16:32


(Bicitra)


 Citric         30 ml          STK-MED        7/15/19       DC       



Acid/
 Sodium                 ONCE
 .ROUTE
  16:31



Citrate
                                     7/15/19 16:32


(Bicitra)


 Lidocaine/
    30 ml          STK-MED        7/15/19       DC       



Epinephrine
                  ONCE
 .ROUTE
  16:41



(Xylocaine                                   7/15/19 16:42


1.5%/
 Epi


Mpf(Sdv))


 Morphine       10 mg          STK-MED        7/15/19       DC       



Sulfate
                      ONCE
 .ROUTE
  16:41



(Duramorph)                                  7/15/19 16:42


                10 mg          STK-MED        7/15/19       DC       



Metoclopramid                 ONCE
 .ROUTE
  16:41



e HCl
                                       7/15/19 16:42


(Reglan)


 Ketorolac
     30 mg          STK-MED        7/15/19       DC       



Tromethamine
                 ONCE
 .ROUTE
  16:41



 (Toradol)                                   7/15/19 16:42


 Gentamicin     100 ml @ 
     NOW  ONCE
     7/15/19       DC       



Sulfate        200 mls/hr     IVPB
          17:00



                                             7/15/19 17:02


 Oxytocin
      10 units       STK-MED        7/15/19       DC       



(Oxytocin)                    ONCE
 .ROUTE
  17:25



                                             7/15/19 17:26


 Fentanyl
      100 mcg        STK-MED        7/15/19       DC       



(Sublimaze)                   ONCE
 .ROUTE
  17:32



                                             7/15/19 17:33


 Oxytocin
      10 units       STK-MED        7/15/19       DC       



(Oxytocin)                    ONCE
 .ROUTE
  17:36



                                             7/15/19 17:37


 IV Flush
      3 ml           PER            7/15/19                



(NS 3 ml)                     PROTOCOL
 IV
  18:30



                500 ml @ 
     Q10H
 IV
      7/15/19       DC       



Oxytocin/Lact  50 mls/hr                     18:15



ated
                                        7/15/19 21:46


Ringer's


 Simethicone
   160 mg         Q8H  PRN
      7/15/19                



 (Mylicon)                    PO
 .GAS       18:30



 Sodium
        133 ml         DAILY  PRN
    7/15/19                



Biphosphate/
                 [place]
            18:30



Sodium                        .CONSTIPATION


Phosphate



(Fleet Enema)


 Lanolin
       1 applic       BEDSIDE        7/15/19                



(Lanolin Hpa)                 MEDICATION     18:30



                              PRN
 TOP



                              .NIPPLES


 Diphtheria/
   0.5 ml         ONCE ONCE
     19       DC       



Tetanus/Acell                 IM*
           09:00




 Pertussis
                                 19 09:01


(Adacel)


                500 ml @ 0
    ONCE PRN
      7/15/19       DC       



Oxytocin/Lact  mls/hr         IV
 .VAGINAL   18:30



ated
                         BLEEDING       7/15/19 21:46


Ringer's


                0.2 mg         ONCE PRN
      7/15/19       DC       



Methylergonov                 IM
 .VAGINAL   18:30



ine
 Maleate
                 BLEEDING       7/15/19 21:46


 (Methergine)


 Carboprost
    250 mcg        ONCE PRN
      7/15/19       DC       



Tromethamine
                 IM
 .VAGINAL   18:30



 (Hemabate)                   BLEEDING       7/15/19 21:46


 Misoprostol
   1,000 mcg      ONCE PRN
      7/15/19                



 (Cytotec)                    [place]
 .VAGINAL   18:30



                              BLEEDING


 Morphine       2 mg           PACU PRN
      7/15/19       DC       



Sulfate
                      IV
 PAIN       18:30



(morphine)                    LEVEL 1-3      19 02:00


 Morphine       4 mg           PACU PRN
      7/15/19       DC       



Sulfate
                      IV
 PAIN       18:30



(morphine)                    LEVEL 4-6      19 02:00


 Morphine       6 mg           PACU PRN
      7/15/19       DC       



Sulfate
                      IV
 PAIN       18:30



(morphine)                    LEVEL 7-10     19 02:00


 Ketorolac
     30 mg          PACU ORDER     7/15/19       DC       



Tromethamine
                 PRN
 IV
 FOR   18:30



 (Toradol)                    PAIN AFTER IV  19 02:00


                              NARCOTIC MED


 Ondansetron    4 mg           PACU ORDER     7/15/19       DC       



HCl
  (Zofran                 PRN
 IV
       18:30



Inj)                          NAUSEA/VOMITI  19 02:00


                              NG


                25 mg          PACU ORDER     7/15/19       DC       



Diphenhydrami                 PRN
 IV
       18:30



ne
 HCl
                      .PRURITUS      19 02:00


(Benadryl)


 Naloxone       0.1 mg         Q2M  PRN
      7/15/19       DC       



HCl
                          IV
 .RESP      18:30



(Narcan)                      RATE           19 18:29


 Ketorolac
     30 mg          Q6H  PRN
      7/15/19       DC           19


Tromethamine
                 IV
 PAIN       18:30
                       15:00



 (Toradol)                    AFTER          19 18:29


                              CSECTION


 Morphine       3 mg           Q3  PRN
 IV
   7/15/19       DC       



Sulfate
                      .BREAKTHROUGH  18:30



(morphine)                    PAIN           19 18:29


 Morphine       2 mg           Q3H  PRN
      7/15/19       DC       



Sulfate
                      IV
 .PAIN 1-5  18:30



(morphine)                                   19 18:29


 Morphine       4 mg           Q3H  PRN
      7/15/19       DC       



Sulfate
                      IV
 .PAIN      18:30



(morphine)                    6-10           19 18:29


                25 mg          Q6H  PRN
      7/15/19       DC       



Diphenhydrami                 IV
 .ITCHING   18:30



ne
 HCl
                                     19 18:29


(Benadryl)


 Ondansetron    4 mg           Q6H  PRN
      7/15/19       DC       



HCl
  (Zofran                 IV
            18:30



Inj)                          .NAUSEA/VOMIT  19 18:29


                              ING


 Sodium         1,000 ml @ 
   Q0M
 IV
       19                



Chloride       0 mls/hr                      10:00



                1 tab          Q8
 PO
        19


Acetaminophen                                22:00
                       13:49



/



Hydrocodone


Bitart



(Norco


(5/325))


 Ibuprofen
     800 mg         Q8
 PO
        19


(Motrin)                                     22:00
                       13:49



 Docusate       100 mg         BID
 PO
       19


Sodium
                                      21:00
                       09:27



(Colace)


 Magnesium
     30 ml          DAILY  PRN
    19                



Hydroxide
                    PO
            18:30



(Milk Of Mag)                 CONSTIPATION


                1,000 mcg      ONCE  ONCE
    19       DC           19


Cyanocobalami                 SC
            14:00
                       14:05



n
  (Vitamin                                 19 14:01


B12 Inj)


 Ferrous        325 mg         TID
 PO
       19


Sulfate
                                     14:00
                       13:48



(Ferrous


Sulfate



(Ec))








Physical examination:


General appears alert and oriented x4 does not appear to be in any acute 


distress


Abdomen: Soft, fundus palpable 3 cm below the umbilicus.  Appropriate tenderness


in the  incision.


Incision: Clean dry and intact


Lungs: Clear to auscultation bilaterally


CV: RRR


Extremities: No calf tenderness, no click no edema no cord palpable


Breast: No evidence of mastitis or fissure





Assessment


Postop day #3


Status post  section


Anemia, postpartum hemorrhage, status post transfusion 3 units of blood.  


Hemoglobin improved.  Recent hemoglobin 7.  Patient able to ambulate without any


symptoms.


She is currently stable for discharge.  Her vitals are stable.


Discussed with patient to have a follow-up in 2 weeks and 6 weeks postpartum 


with the clinic.


Discussed with patient regarding taking 2 iron pills with stool softener after 


discharge from the hospital


Recommended to use tight bra to prevent of breast engorgement as well as ice 


compress.  She declined breast-feeding.  Understands benefits of breast-feeding 


however she declined.





Patient verbalized understanding.  All questions were answered.











MARVEL MISTRY MD              2019 14:57

## 2019-07-18 NOTE — DS
Date/Time of Note


Date/Time of Note


DATE: 19 


TIME: 14:57





Discharge Summary


Admission/Discharge Info


Admit Date/Time


2019 at 23:09


Discharge Date/Time


2019


Patient Condition:  Good


Procedures


Primary low transverse  section, elective


Suspected macrosomia


Hx of Present Illness


[id number] at 39w1d admitted for induction of labor due to suspected 


macrosomia.  Patient requested for elective  section although she was in


the first stage of labor.  She had elective low transverse  section.  


Intraoperative course was complicated by postpartum hemorrhage due to uterine 


atony.  It was controlled however patient had postop anemia.  Her hemoglobin 


dropped down to 5 due to postpartum hemorrhage.  She underwent blood 


transfusion.  Hemoglobin improved after blood transfusion 2 7 and remained 


stable after that.  On postoperative day #3 patient was noted to be stable 


enough to be discharged home.  She was ambulating.  Tolerated regular diet.  


Passed flatus.  Her vitals were stable.  She was ambulating without any symptom.


 She was advised to take iron twice a day with stool softener and follow-up in 2


weeks and 6 weeks with OB office or sooner as needed.  She had mild leukocytosis


that was reactive.  She was asymptomatic.  She was afebrile.








Hospital Course


Complicated by postpartum hemorrhage due to uterine atony status post blood 


transfusion


Home Meds


Reported Medications


Ferrous Sulfate (Iron) 134 Mg Tablet, 134 MG PO DAILY, TAB


   19


PNV115-Iron Fumarate-FA-DSS (Prenatal 19) 1 Each Tablet, 1 TAB PO DAILY, TAB


   18


Discontinued Reported Medications


[None]   No Conflict Check


   11/1/10


Discontinued Scripts


Acetaminophen* (Tylophen*) 500 Mg Capsule, 2 CAP PO Q8H PRN for PAIN AND OR 


ELEVATED TEMP, #20 CAP


   Prov:ЕКАТЕРИНА AMAYA PA-C         18


Follow-up Plan


In 2 weeks and 6 weeks with OB office or sooner as needed


Primary Care Provider


Care Physician No Primary


Time spent on discharge:   > 30 minutes











MARVEL MISTRY MD              2019 14:57

## 2019-07-19 NOTE — DELSUM
===================================

Delivery Summary A-C

===================================

Datetime Report Generated by CPN: 2019 17:04

   

   

===================================

DELIVERY PERSONNEL

===================================

   

Circulator:  Buckunnvarghese, Phoebe

   

===================================

MATERNAL INFORMATION

===================================

   

Delivery Anesthesia:  Epidural

Medications in Delivery:  see anesthesia record

Delivery QBL (ml):  1700

Placenta Cultured:  No

Maternal Complications:  Other

   

===================================

LABOR SUMMARY

===================================

   

EDC:  2019 00:00

No. Babies in Womb:  1

 Attempted:  No

Labor Anesthesia:  Epidural

   

===================================

LABOR INFORMATION

===================================

   

Reason for Induction:  Macrosomia; Not Applicable

Onset of Labor:  2019 17:30

Complete Dilatation:  07/15/2019 15:49

Cervical Ripening Agents:  Cytotec @ 0931

Oxytocin:  N/A

Group B Beta Strep:  Negative

Antibiotics # of Doses:  2

Antibiotics Time of Last Dose:  07/15/2019 17:00

Steroids Given:  None

Reason Steroids Not Administered:  Not Applicable

   

===================================

MEMBRANES

===================================

   

Membranes Rupture Method:  Artificial

Rupture of Membranes:  07/15/2019 11:06

Length of Rupture (hr):  6.27

Amniotic Fluid Color:  Clear

Amniotic Fluid Amount:  Large

Amniotic Fluid Odor:  Normal

   

===================================

STAGES OF LABOR

===================================

   

Stage 1 hr:  22

Stage 1 min:  19

Stage 2 hr:  1

Stage 2 min:  33

Stage 3 hr:  0

Stage 3 min:  1

Total Time in Labor hr:  23

Total Time in Labor min:  53

   

===================================

CSECTION DELIVERY

===================================

   

Primary Indication:  Other

Other Primary Indication:  elective

CSection Urgency:  Elective

CSection Incidence:  Primary

Labor:  Labor

Elective:  Elective

CSection Incision:  Lower Uterine Transverse

   

===================================

BABY A INFORMATION

===================================

   

Infant Delivery Date/Time:  07/15/2019 17:22

Method of Delivery:  

Born in Route :  No

:  N/A

Forceps:  N/A

Vacuum Extraction:  N/A

Shoulder Dystocia :  N/A

   

===================================

SHOULDER DYSTOCIA BABY A

===================================

   

Infant Delivery Date/Time:  07/15/2019 17:22

   

===================================

PRESENTATION/POSITION BABY A

===================================

   

Presentation:  Cephalic

Cephalic Presentation:  Vertex

Vertex Position:  Left Occipital Anterior

Breech Presentation:  N/A

   

===================================

PLACENTA INFORMATION BABY A

===================================

   

Placenta Delivery Time :  07/15/2019 17:23

Placenta Method of Delivery:  Manual Removal

Placenta Status:  Delivered

   

===================================

APGAR SCORES BABY A

===================================

   

Heart Rate 1 min:  >100 bpm

Resp Effort 1 min:  Good Cry

Reflex Irritability 1 min:  Cough/Sneeze/Pulls Away

Muscle Tone 1 min:  Active Motion

Color 1 min:  Blue/Pale

Resuscitation Effort 1 min:  Tactile Stimulation

APGAR SCORE 1 MIN:  8

Heart Rate 5 min:  >100 bpm

Resp Effort 5 min:  Good Cry

Reflex Irritability 5 min:  Cough/Sneeze/Pulls Away

Muscle Tone 5 min:  Active Motion

Color 5 min:  Body Pink, Extremit Blue

Resuscitation Effort 5 min:  Tactile Stimulation

APGAR SCORE 5 MIN:  9

   

===================================

INFANT INFORMATION BABY A

===================================

   

Gestational Age at Delivery:  39.2

Gestational Status:  Full Term- 39- 40.6 Weeks

Infant Outcome :  Liveborn, with signs of life

Infant Condition :  Stable

Infant Sex:  Male

   

===================================

IDENTIFICATION/MEDS BABY A

===================================

   

ID Band Number:  [id number]

ID Band Location:  Right Leg; Left Arm



Sensor Applied:  Yes

Sensor Number:  [id number]

Sensor Location :  Cord Clamp

Vitamin K Given :  Not Given

Erythromycin Given:  Not Given

   

===================================

WEIGHT/LENGTH BABY A

===================================

   

Infant Birthweight (gm):  3580

Infant Weight (lb):  7

Infant Weight (oz):  14

Infant Length (in):  19.50

Infant Length (cm):  49.53

   

===================================

CORD INFORMATION BABY A

===================================

   

No. Cord Vessels:  3

Nuchal Cord :  N/A

Nuchal Cord- Other:  0

True Knot:  0

Cord Blood Taken:  Yes

Banking/Donate Info:  NO

Infant Suction:  Mouth; Nose

   

===================================

ASSESSMENT BABY A

===================================

   

Infant Complications:  Extended Fetal Tachycardi; Multiple Variable Decels

Physical Findings at Delivery:  Within Normal Limits

Infant Respirations:  Appears Normal

Neonatologist/ALS Called :  No

Transferred To:  Remains with Mother

## 2019-07-20 NOTE — PAC
Date/Time of Note


Date/Time of Note


DATE: 7/15/19 


TIME: 22:00





Post-Anesthesia Notes


Post-Anesthesia Note


Last documented vital signs





Vital Signs


  Date      Temp  Pulse  Resp  B/P (MAP)   Pulse Ox  O2          O2 Flow    FiO2


Time                                                 Delivery    Rate


   7/18/19  98.2     54    16      115/78            Room Air


     08:30                           (90)


   7/16/19                                       95


     12:20





Activity:  WNL


Respiratory function:  WNL


Cardiovascular function:  WNL


Mental status:  Baseline


Pain reasonably controlled:  Yes


Hydration appropriate:  Yes


Nausea/Vomiting absent:  Yes











CAS HANSON MD           Jul 20, 2019 12:55

## 2019-08-29 ENCOUNTER — HOSPITAL ENCOUNTER (EMERGENCY)
Dept: HOSPITAL 91 - E/R | Age: 29
Discharge: HOME | End: 2019-08-29
Payer: COMMERCIAL

## 2019-08-29 ENCOUNTER — HOSPITAL ENCOUNTER (EMERGENCY)
Dept: HOSPITAL 10 - E/R | Age: 29
Discharge: HOME | End: 2019-08-29
Payer: COMMERCIAL

## 2019-08-29 VITALS — HEART RATE: 71 BPM | DIASTOLIC BLOOD PRESSURE: 67 MMHG | RESPIRATION RATE: 15 BRPM | SYSTOLIC BLOOD PRESSURE: 108 MMHG

## 2019-08-29 VITALS
WEIGHT: 149.91 LBS | HEIGHT: 63 IN | HEIGHT: 63 IN | BODY MASS INDEX: 26.56 KG/M2 | WEIGHT: 149.91 LBS | BODY MASS INDEX: 26.56 KG/M2

## 2019-08-29 DIAGNOSIS — B96.89: ICD-10-CM

## 2019-08-29 DIAGNOSIS — F41.9: ICD-10-CM

## 2019-08-29 LAB
ADD MAN DIFF?: NO
ADD UMIC: YES
ALANINE AMINOTRANSFERASE: 43 IU/L (ref 13–69)
ALBUMIN/GLOBULIN RATIO: 1.29
ALBUMIN: 4.8 G/DL (ref 3.3–4.9)
ALKALINE PHOSPHATASE: 103 IU/L (ref 42–121)
ANION GAP: 12 (ref 5–13)
ASPARTATE AMINO TRANSFERASE: 36 IU/L (ref 15–46)
BASOPHIL #: 0.1 10^3/UL (ref 0–0.1)
BASOPHILS %: 0.9 % (ref 0–2)
BILIRUBIN,DIRECT: 0 MG/DL (ref 0–0.2)
BILIRUBIN,TOTAL: 0.8 MG/DL (ref 0.2–1.3)
BLOOD UREA NITROGEN: 17 MG/DL (ref 7–20)
CALCIUM: 9.9 MG/DL (ref 8.4–10.2)
CARBON DIOXIDE: 21 MMOL/L (ref 21–31)
CHLORIDE: 106 MMOL/L (ref 97–110)
CREATININE: 0.96 MG/DL (ref 0.44–1)
EOSINOPHILS #: 0.2 10^3/UL (ref 0–0.5)
EOSINOPHILS %: 3.1 % (ref 0–7)
GLOBULIN: 3.7 G/DL (ref 1.3–3.2)
GLUCOSE: 84 MG/DL (ref 70–220)
HEMATOCRIT: 35.5 % (ref 37–47)
HEMOGLOBIN: 11.2 G/DL (ref 12–16)
IMMATURE GRANS #M: 0.03 10^3/UL (ref 0–0.03)
IMMATURE GRANS % (M): 0.4 % (ref 0–0.43)
LIPASE: 20 U/L (ref 23–300)
LYMPHOCYTES #: 1.5 10^3/UL (ref 0.8–2.9)
LYMPHOCYTES %: 22.8 % (ref 15–51)
MEAN CORPUSCULAR HEMOGLOBIN: 26.8 PG (ref 29–33)
MEAN CORPUSCULAR HGB CONC: 31.5 G/DL (ref 32–37)
MEAN CORPUSCULAR VOLUME: 84.9 FL (ref 82–101)
MEAN PLATELET VOLUME: 11.9 FL (ref 7.4–10.4)
MONOCYTE #: 0.6 10^3/UL (ref 0.3–0.9)
MONOCYTES %: 9.3 % (ref 0–11)
NEUTROPHIL #: 4.3 10^3/UL (ref 1.6–7.5)
NEUTROPHILS %: 63.5 % (ref 39–77)
NUCLEATED RED BLOOD CELLS #: 0 10^3/UL (ref 0–0)
NUCLEATED RED BLOOD CELLS%: 0 /100WBC (ref 0–0)
PLATELET COUNT: 280 10^3/UL (ref 140–415)
POTASSIUM: 3.6 MMOL/L (ref 3.5–5.1)
RED BLOOD COUNT: 4.18 10^6/UL (ref 4.2–5.4)
RED CELL DISTRIBUTION WIDTH: 16.4 % (ref 11.5–14.5)
SODIUM: 139 MMOL/L (ref 135–144)
TOTAL PROTEIN: 8.5 G/DL (ref 6.1–8.1)
UR ASCORBIC ACID: NEGATIVE MG/DL
UR BACTERIA: (no result) /HPF
UR BILIRUBIN (DIP): NEGATIVE MG/DL
UR BLOOD (DIP): (no result) MG/DL
UR CLARITY: (no result)
UR COLOR: (no result)
UR GLUCOSE (DIP): NEGATIVE MG/DL
UR KETONES (DIP): (no result) MG/DL
UR LEUKOCYTE ESTERASE (DIP): (no result) LEU/UL
UR MUCUS: (no result) /HPF
UR NITRITE (DIP): POSITIVE MG/DL
UR PH (DIP): 5 (ref 5–9)
UR RBC: 1 /HPF (ref 0–5)
UR SPECIFIC GRAVITY (DIP): 1.02 (ref 1–1.03)
UR SQUAMOUS EPITHELIAL CELL: (no result) /HPF
UR TOTAL PROTEIN (DIP): (no result) MG/DL
UR UROBILINOGEN (DIP): NEGATIVE MG/DL
UR WBC: 11 /HPF (ref 0–5)
WHITE BLOOD COUNT: 6.8 10^3/UL (ref 4.8–10.8)

## 2019-08-29 PROCEDURE — 80053 COMPREHEN METABOLIC PANEL: CPT

## 2019-08-29 PROCEDURE — 81025 URINE PREGNANCY TEST: CPT

## 2019-08-29 PROCEDURE — 96374 THER/PROPH/DIAG INJ IV PUSH: CPT

## 2019-08-29 PROCEDURE — 81001 URINALYSIS AUTO W/SCOPE: CPT

## 2019-08-29 PROCEDURE — 96361 HYDRATE IV INFUSION ADD-ON: CPT

## 2019-08-29 PROCEDURE — 99284 EMERGENCY DEPT VISIT MOD MDM: CPT

## 2019-08-29 PROCEDURE — 36415 COLL VENOUS BLD VENIPUNCTURE: CPT

## 2019-08-29 PROCEDURE — 83690 ASSAY OF LIPASE: CPT

## 2019-08-29 PROCEDURE — 85025 COMPLETE CBC W/AUTO DIFF WBC: CPT

## 2019-08-29 PROCEDURE — 93005 ELECTROCARDIOGRAM TRACING: CPT

## 2019-08-29 RX ADMIN — THIAMINE HYDROCHLORIDE 1 MLS/HR: 100 INJECTION, SOLUTION INTRAMUSCULAR; INTRAVENOUS at 09:14

## 2019-08-29 RX ADMIN — LORAZEPAM 1 MG: 2 INJECTION, SOLUTION INTRAMUSCULAR; INTRAVENOUS at 09:14

## 2021-01-12 ENCOUNTER — HOSPITAL ENCOUNTER (EMERGENCY)
Dept: HOSPITAL 54 - ER | Age: 31
Discharge: HOME | End: 2021-01-12
Payer: COMMERCIAL

## 2021-01-12 VITALS — DIASTOLIC BLOOD PRESSURE: 81 MMHG | SYSTOLIC BLOOD PRESSURE: 141 MMHG

## 2021-01-12 VITALS — BODY MASS INDEX: 26.58 KG/M2 | WEIGHT: 150 LBS | HEIGHT: 63 IN

## 2021-01-12 DIAGNOSIS — T81.49XA: Primary | ICD-10-CM

## 2025-03-21 NOTE — PN
Anesthesia Post Evaluation    Patient: Marcos Santana    Procedure(s) Performed: Procedure(s) (LRB):  COLONOSCOPY (N/A)    Final Anesthesia Type: general      Patient location during evaluation: GI PACU  Patient participation: Yes- Able to Participate  Level of consciousness: awake and alert and oriented  Post-procedure vital signs: reviewed and stable  Pain management: adequate  Airway patency: patent    PONV status at discharge: No PONV  Anesthetic complications: no      Cardiovascular status: blood pressure returned to baseline  Respiratory status: unassisted, spontaneous ventilation and room air  Hydration status: euvolemic  Follow-up not needed.              Vitals Value Taken Time   /73 03/21/25 09:05   Temp 36.4 °C (97.5 °F) 03/21/25 08:55   Pulse 59 03/21/25 09:08   Resp 18 03/21/25 09:10   SpO2 98 % 03/21/25 09:08   Vitals shown include unfiled device data.      No case tracking events are documented in the log.      Pain/Ann Score: Ann Score: 10 (3/21/2025  8:56 AM)           Date/Time of Note


Date/Time of Note


DATE: 19 


TIME: 13:09





OB Subjective


Subjective


Subjective


POD#1


Patient is doing well. She denies nausea, vomiting, shortness of breath, chest 


pain, headache. She has been ambulating without difficulty, tolerating regular 


diet. Pain is well controlled on current medications





OB Objective


Objective


Objective





General: AAO X 3, comfortable, NAD, appropriate mood and affect.


Heart: RRR +S1, +S2, no murmurs.


Lungs: Clear to auscultation (B/L), no rales, rhonchi or wheezing


ABD: +BS. Soft, non-tender. Uterus 2 cm below umbilicus 


Incision: Clear, dry, intact. No erythema, drainage or induration. 


Flank: No CVA tenderness (B/L)


LE: Mild edema. No clubbing, cyanosis, thigh or calf tenderness (B/L). Homans 


'sign is negative





OB  Assessment/Plan


Other plan:





29 years old  4 para 2-0-1-2 s/p  delivery at 39 weeks and 2 


days.  POD#1


-  AF, VSS


-  Baby is doing well, at bed side. She is bonding well


-  Continue Postpartum care





2.  Postpartum hemorrhage: She has received a 2 unit of packed packed RBC after 


proposed surgery currently her hemoglobin is less than 7.  Blood transfusion 


with risk-benefit alternative discussed in detail with patient.  She has agreed 


to receive another unit of packed RBC which ordered.  Repeat hemoglobin tomorrow


morning.











JAMES SHERIFF                  2019 13:09